# Patient Record
Sex: FEMALE | Race: BLACK OR AFRICAN AMERICAN | NOT HISPANIC OR LATINO | Employment: PART TIME | URBAN - METROPOLITAN AREA
[De-identification: names, ages, dates, MRNs, and addresses within clinical notes are randomized per-mention and may not be internally consistent; named-entity substitution may affect disease eponyms.]

---

## 2017-08-08 ENCOUNTER — OFFICE VISIT (OUTPATIENT)
Dept: SLEEP MEDICINE | Facility: CLINIC | Age: 72
End: 2017-08-08
Payer: MEDICARE

## 2017-08-08 VITALS
WEIGHT: 268.06 LBS | BODY MASS INDEX: 43.93 KG/M2 | DIASTOLIC BLOOD PRESSURE: 79 MMHG | SYSTOLIC BLOOD PRESSURE: 140 MMHG | HEART RATE: 79 BPM

## 2017-08-08 DIAGNOSIS — G47.30 SLEEP APNEA, UNSPECIFIED: Primary | ICD-10-CM

## 2017-08-08 DIAGNOSIS — J30.89 CHRONIC NON-SEASONAL ALLERGIC RHINITIS, UNSPECIFIED TRIGGER: ICD-10-CM

## 2017-08-08 DIAGNOSIS — F45.8 BRUXISM: ICD-10-CM

## 2017-08-08 DIAGNOSIS — E66.01 MORBID OBESITY WITH BMI OF 40.0-44.9, ADULT: ICD-10-CM

## 2017-08-08 PROCEDURE — 99999 PR PBB SHADOW E&M-EST. PATIENT-LVL IV: CPT | Mod: PBBFAC,,, | Performed by: NURSE PRACTITIONER

## 2017-08-08 PROCEDURE — 99204 OFFICE O/P NEW MOD 45 MIN: CPT | Mod: S$PBB,,, | Performed by: NURSE PRACTITIONER

## 2017-08-08 PROCEDURE — 1125F AMNT PAIN NOTED PAIN PRSNT: CPT | Mod: ,,, | Performed by: NURSE PRACTITIONER

## 2017-08-08 PROCEDURE — 1159F MED LIST DOCD IN RCRD: CPT | Mod: ,,, | Performed by: NURSE PRACTITIONER

## 2017-08-08 PROCEDURE — 3077F SYST BP >= 140 MM HG: CPT | Mod: ,,, | Performed by: NURSE PRACTITIONER

## 2017-08-08 PROCEDURE — 3078F DIAST BP <80 MM HG: CPT | Mod: ,,, | Performed by: NURSE PRACTITIONER

## 2017-08-08 PROCEDURE — 99214 OFFICE O/P EST MOD 30 MIN: CPT | Mod: PBBFAC | Performed by: NURSE PRACTITIONER

## 2017-08-08 RX ORDER — CHOLECALCIFEROL (VITAMIN D3) 25 MCG
5000 TABLET ORAL DAILY
COMMUNITY

## 2017-08-08 RX ORDER — NAPROXEN SODIUM 220 MG
220 TABLET ORAL 2 TIMES DAILY WITH MEALS
COMMUNITY
End: 2022-11-07

## 2017-08-08 RX ORDER — BUDESONIDE AND FORMOTEROL FUMARATE DIHYDRATE 160; 4.5 UG/1; UG/1
2 AEROSOL RESPIRATORY (INHALATION) EVERY 12 HOURS
COMMUNITY

## 2017-08-08 RX ORDER — IBUPROFEN 100 MG/5ML
1000 SUSPENSION, ORAL (FINAL DOSE FORM) ORAL DAILY
COMMUNITY

## 2017-08-08 RX ORDER — FLUTICASONE PROPIONATE 50 MCG
SPRAY, SUSPENSION (ML) NASAL
Refills: 3 | COMMUNITY
Start: 2017-06-13

## 2017-08-08 RX ORDER — MINERAL OIL
180 ENEMA (ML) RECTAL DAILY
COMMUNITY
End: 2022-11-07

## 2017-08-08 RX ORDER — ASPIRIN 81 MG/1
81 TABLET ORAL DAILY
COMMUNITY

## 2017-08-08 NOTE — PROGRESS NOTES
"Conchis Ford  was seen as a new patient at the request of  Isaac Butt MD for the evaluation of obstructive sleep apnea.    CHIEF COMPLAINT:    Chief Complaint   Patient presents with    Sleep Apnea       HISTORY OF PRESENT ILLNESS: Conchis Ford is a 71 y.o. female is here for sleep evaluation.       Patient complaints include: snoring, interrupted sleep, excessive daytime sleepiness, excessive daytime fatigue, witnessed apnea (since 1996) and unrefreshing sleep.   "I wake up drained like I didn't sleep"    Wears upper and lower partials. Has broken dental bridges in the past due to chronic teeth grinding. Removes partials before bed. Stopped wearing mouth guard 2 years ago.     Denies symptoms of restless legs or kicking during sleep.    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    SLEEP ROUTINE:    Bed partner:    Time to bed:  10 pm before work day, 10:30 pm before off day  Sleep onset latency:  10 minutes  Disruptions or awakenings:  3 - 4 x    Wakeup time:    6 a - 7 am  Perceived sleep quality:  2/5           PAST MEDICAL HISTORY:    Active Ambulatory Problems     Diagnosis Date Noted    Obesity 08/09/2016    Hypertension 08/09/2016    Chest pain, non-cardiac 08/09/2016     Resolved Ambulatory Problems     Diagnosis Date Noted    No Resolved Ambulatory Problems     Past Medical History:   Diagnosis Date    Anemia     Arthritis     Asthma     Dizziness     Hay fever     Hyperlipidemia     Hypertension     Obesity                 PAST SURGICAL HISTORY:    Past Surgical History:   Procedure Laterality Date    HYSTERECTOMY      KNEE SURGERY      septiplasty  09/91         FAMILY HISTORY:                No family history on file.    SOCIAL HISTORY:          Tobacco:   History   Smoking Status    Former Smoker    Packs/day: 1.00    Years: 30.00    Types: Cigarettes   Smokeless " Tobacco    Not on file       Alcohol use:    History   Alcohol Use No                 ALLERGIES:    Review of patient's allergies indicates:   Allergen Reactions    Ace inhibitors Itching    Ibuprofen Swelling     Lip swelling    Penicillins Hives       CURRENT MEDICATIONS:    Current Outpatient Prescriptions   Medication Sig Dispense Refill    amlodipine (NORVASC) 10 MG tablet 10 mg once daily.   2    ascorbic acid, vitamin C, (VITAMIN C) 1000 MG tablet Take 1,000 mg by mouth once daily.      aspirin (ECOTRIN) 81 MG EC tablet Take 81 mg by mouth once daily.      b complex vitamins capsule Take 1 capsule by mouth once daily.      brimonidine 0.2% (ALPHAGAN) 0.2 % Drop 3 (three) times daily.   4    budesonide-formoterol 160-4.5 mcg (SYMBICORT) 160-4.5 mcg/actuation HFAA Inhale 2 puffs into the lungs every 12 (twelve) hours. Controller      BYSTOLIC 10 mg Tab 10 mg once daily.   1    cyanocobalamin (VITAMIN B-12) 1000 MCG tablet Take 100 mcg by mouth once daily.      fexofenadine (ALLEGRA) 180 MG tablet Take 180 mg by mouth once daily.      fluticasone (FLONASE) 50 mcg/actuation nasal spray SPRAY ONCE  IN EACH NOSTRIL   BID  3    hydrochlorothiazide (HYDRODIURIL) 25 MG tablet Take 25 mg by mouth once daily.   2    lovastatin (MEVACOR) 20 MG tablet TK 1 T PO QD  2    metformin (GLUCOPHAGE) 1000 MG tablet 1,000 mg 2 (two) times daily with meals.   2    multivitamin capsule Take 1 capsule by mouth once daily.      naproxen sodium (ANAPROX) 220 MG tablet Take 220 mg by mouth 2 (two) times daily with meals.      potassium chloride SA (K-DUR,KLOR-CON) 20 MEQ tablet Take 20 mEq by mouth once daily.   1    PROAIR HFA 90 mcg/actuation inhaler INL 2 PFS PO Q 6 H PRF SOB OR WHZ  2    ranitidine (ZANTAC) 150 MG tablet Take 150 mg by mouth 2 (two) times daily.      vitamin D 1000 units Tab Take 5,000 Units by mouth once daily.       No current facility-administered medications for this visit.                    REVIEW OF SYSTEMS:     Sleep related symptoms as per HPI.  CONST:Denies weight gain    HEENT: Denies sinus congestion; sometimes dry mouth in AM  PULM: Sometimesdyspnea  CARD:  Reports palpitations   GI:  Reports acid reflux  : Denies polyuria  NEURO: Denies headaches; reports dizziness  PSYCH: Denies mood disturbance  HEME: Denies anemia    Otherwise, a balance of systems reviewed is negative.          PHYSICAL EXAM:  Vitals:    08/08/17 1341   BP: (!) 140/79   Pulse: 79   Weight: 121.6 kg (268 lb 1.3 oz)   PainSc:   4   PainLoc: Knee     Body mass index is 43.93 kg/m².     GENERAL: Obese body habitus, well groomed  HEENT:  Conjunctivae are non-erythematous; Pupils equal, round, and reactive to light; Nose is symmetrical; Nasal mucosa is pink and moist; Septum is midline; Inferior turbinates are enlarged; Nasal airflow left > right; Posterior pharynx is pink; Modified Mallampati: III; Posterior palate is normal; Tonsils +1; Uvula is normal and pink;Tongue is poor; Dentition is poor; No TMJ tenderness; Jaw opening and protrusion without click and without discomfort.  NECK: Supple. Neck circumference is 15 inches. No thyromegaly. No palpable nodes.    SKIN: On face and neck: No abrasions, no rashes, no lesions.  No subcutaneous nodules are palpable.  RESPIRATORY: Chest is clear to auscultation.  Normal chest expansion and non-labored breathing at rest.  CARDIOVASCULAR: Normal S1, S2.  No murmurs, gallops or rubs. No carotid bruits bilaterally.  EXTREMITIES: No edema. No clubbing. No cyanosis. Station normal. Gait normal.        NEURO/PSYCH: Oriented to time, place and person. Normal attention span and concentration. Affect is full. Mood is normal.                                              ASSESSMENT:    Sleep apnea, unspecified. The patient symptomatically has snoring, interrupted sleep, excessive daytime sleepiness, excessive daytime fatigue, witnessed apnea and unrefreshing sleep with findings of crowded  oral airway and elevated body mas index. Medical co-morbidities: essential HTN, asthma, DM2, HLD, and morbid obesity.  This warrants further investigation for possible obstructive sleep apnea.      Bruxism, chronic     Allergic rhinitis, chronic, mostly controlled with daily Flonase and Zyrtec; congestion could make acclimating to PAP therapy more difficult     Morbid obesity, BMI >40, discussed relationship between weight and RAMESH    PLAN:    - Diagnostic: Polysomnogram. The nature of this procedure and its indication was discussed with the patient. Patient will be contacted after sleep study is done.  RTC to review sleep study results.     - Education: During our discussion today, we talked about the etiology of obstructive sleep apnea as well as the potential ramifications of untreated sleep apnea, which could include daytime sleepiness, hypertension, heart disease and/or stroke. We discussed potential treatment options, which could include weight loss, body positioning, continuous positive airway pressure (CPAP), or referral for surgical consideration.     - Precautions: The patient was advised to abstain from driving should they feel sleepy  or drowsy.     -Counseling regarding benefits of healthy eating and physical activity (150 minutes of moderate-intensity aerobic activity per week) for weight reduction which can improve overall health.     Thank you for allowing me the opportunity to participate in the care of your patient.

## 2017-08-08 NOTE — LETTER
August 8, 2017      Isaac Butt MD  [address] [facility] [address]           [facility]  [address]  [facility] [address]  Phone: [phone]          Patient: Conchis Fodr   MR Number: [id number]   YOB: 1945   Date of Visit: 8/8/2017       Dear Dr. Isaac Butt:    Thank you for referring Conchis Ford to me for evaluation. Attached you will find relevant portions of my assessment and plan of care.    If you have questions, please do not hesitate to call me. I look forward to following Conchis Ford along with you.    Sincerely,    Alma Delia Pickens, ANASTASIA    Enclosure  CC:  No Recipients    If you would like to receive this communication electronically, please contact [email or web address] or [phone] to request more information on codebender Link access.    For providers and/or their staff who would like to refer a patient to [facility], please contact us through our one-stop-shop provider referral line, [facility] , at [phone].    If you feel you have received this communication in error or would no longer like to receive these types of communications, please e-mail [email or web address]

## 2017-08-08 NOTE — PATIENT INSTRUCTIONS
[facility] will contact you to schedule your sleep study. Their number is [phone] (ext 2). The [facility] is located on 7th floor of the [facility].    We will call you when the sleep study results are ready - if you have not heard from us by 2 weeks from the date of the study, please call [phone] (ext 1).    You are advised to abstain from driving should you feel sleepy or drowsy.

## 2017-08-11 ENCOUNTER — TELEPHONE (OUTPATIENT)
Dept: SLEEP MEDICINE | Facility: OTHER | Age: 72
End: 2017-08-11

## 2017-08-19 ENCOUNTER — HOSPITAL ENCOUNTER (OUTPATIENT)
Dept: SLEEP MEDICINE | Facility: OTHER | Age: 72
Discharge: HOME | End: 2017-08-19
Attending: NURSE PRACTITIONER
Payer: MEDICARE

## 2017-08-19 DIAGNOSIS — G47.33 OSA (OBSTRUCTIVE SLEEP APNEA): ICD-10-CM

## 2017-08-19 DIAGNOSIS — G47.30 SLEEP APNEA, UNSPECIFIED: ICD-10-CM

## 2017-08-19 PROCEDURE — 95811 POLYSOM 6/>YRS CPAP 4/> PARM: CPT

## 2017-08-19 PROCEDURE — 95811 POLYSOM 6/>YRS CPAP 4/> PARM: CPT | Mod: 26,,, | Performed by: PSYCHIATRY & NEUROLOGY

## 2017-08-20 NOTE — PROGRESS NOTES
End of The night summary    Type of Study Performed on (DREW JENSEN) Split    Patient education/cpap information prior to Study/Setup     EKG: Appears to be- NSR w pacs, increased ekg     Low Spo2 77%    Any Difficulties recording:NONE    Optimal pressure# 14    MASK: [employer]    Pt reaction to CPAP: pt reports cpap pressure felt alittle too much when pt woke up    Tech summary comments:    pt met criteria for split on cpap, soft to moderate snoring observed , most of patients events observed in REM sleep with significant drop in spo2 observed, in the middle of the pt reported that cpap pressure felt high when pt woke up, pt requested to lower pressure, pt may need to get adjusted to cpap w a ramp or possible bipap pressures more comfortable? pt slept well most of the night no reports of discomfort

## 2017-08-31 ENCOUNTER — TELEPHONE (OUTPATIENT)
Dept: SLEEP MEDICINE | Facility: CLINIC | Age: 72
End: 2017-08-31

## 2017-08-31 NOTE — TELEPHONE ENCOUNTER
Please notify pt that 08/19/2017 in-lab sleep study results are available. Schedule for f/u to review.

## 2017-09-05 ENCOUNTER — OFFICE VISIT (OUTPATIENT)
Dept: SLEEP MEDICINE | Facility: CLINIC | Age: 72
End: 2017-09-05
Payer: MEDICARE

## 2017-09-05 VITALS
DIASTOLIC BLOOD PRESSURE: 82 MMHG | WEIGHT: 265 LBS | HEART RATE: 78 BPM | HEIGHT: 66 IN | SYSTOLIC BLOOD PRESSURE: 132 MMHG | BODY MASS INDEX: 42.59 KG/M2

## 2017-09-05 DIAGNOSIS — E66.01 SEVERE OBESITY (BMI >= 40): ICD-10-CM

## 2017-09-05 DIAGNOSIS — G47.33 OSA (OBSTRUCTIVE SLEEP APNEA): Primary | ICD-10-CM

## 2017-09-05 DIAGNOSIS — F45.8 BRUXISM: ICD-10-CM

## 2017-09-05 PROCEDURE — 3079F DIAST BP 80-89 MM HG: CPT | Mod: ,,, | Performed by: NURSE PRACTITIONER

## 2017-09-05 PROCEDURE — 99214 OFFICE O/P EST MOD 30 MIN: CPT | Mod: S$PBB,,, | Performed by: NURSE PRACTITIONER

## 2017-09-05 PROCEDURE — 3075F SYST BP GE 130 - 139MM HG: CPT | Mod: ,,, | Performed by: NURSE PRACTITIONER

## 2017-09-05 PROCEDURE — 1159F MED LIST DOCD IN RCRD: CPT | Mod: ,,, | Performed by: NURSE PRACTITIONER

## 2017-09-05 PROCEDURE — 1126F AMNT PAIN NOTED NONE PRSNT: CPT | Mod: ,,, | Performed by: NURSE PRACTITIONER

## 2017-09-05 PROCEDURE — 99999 PR PBB SHADOW E&M-EST. PATIENT-LVL IV: CPT | Mod: PBBFAC,,, | Performed by: NURSE PRACTITIONER

## 2017-09-05 PROCEDURE — 99214 OFFICE O/P EST MOD 30 MIN: CPT | Mod: PBBFAC | Performed by: NURSE PRACTITIONER

## 2017-09-05 NOTE — PROGRESS NOTES
"Conchis Ford  was seen in follow-up to discuss sleep study results and potential treatment options.     09/05/2017 Checked-in at 7:46 am for 7:40 am appt.     CHIEF COMPLAINT:    Chief Complaint   Patient presents with    Sleep Apnea       08/08/2017 RILEY Pickens NP: HISTORY OF PRESENT ILLNESS: Conchis Ford is a 71 y.o. female is here for sleep evaluation.       Patient complaints include: snoring, interrupted sleep, excessive daytime sleepiness, excessive daytime fatigue, witnessed apnea (since 1996) and unrefreshing sleep.   "I wake up drained like I didn't sleep"     Wears upper and lower partials. Has broken dental bridges in the past due to chronic teeth grinding. Removes partials before bed. Stopped wearing mouth guard 2 years ago.     Denies symptoms of restless legs or kicking during sleep.    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    SLEEP ROUTINE:    Bed partner:    Time to bed:  10 pm before work day, 10:30 pm before off day  Sleep onset latency:  10 minutes  Disruptions or awakenings:  3 - 4 x    Wakeup time:    6 a - 7 am  Perceived sleep quality:  2/5         INTERVAL HISTORY:    09/05/2017 RILEY Pickens NP: Discussed 08/19/2017 in-lab sleep study results. Pt complaints of snoring, interrupted sleep, excessive daytime sleepiness, excessive daytime fatigue, witnessed apnea, and unrefreshing sleep remain the same. ESS 13.      Baseline Sleep Study: 08/19/2017 Split night study 268 lb. The overall AHI was 11.8 with an oxygen izabella of 77.0%. Effective control of sleep disordered breathing was seen during supine REM stage sleep at a pressure of 14 cm of water.    PAST MEDICAL HISTORY:    Active Ambulatory Problems     Diagnosis Date Noted    Obesity 08/09/2016    Hypertension 08/09/2016    Chest pain, non-cardiac 08/09/2016    Sleep apnea, unspecified     RAMESH (obstructive sleep apnea)  "     Resolved Ambulatory Problems     Diagnosis Date Noted    No Resolved Ambulatory Problems     Past Medical History:   Diagnosis Date    Anemia     Arthritis     Asthma     Dizziness     Hay fever     Hyperlipidemia     Hypertension     Obesity                 PAST SURGICAL HISTORY:    Past Surgical History:   Procedure Laterality Date    HYSTERECTOMY      KNEE SURGERY      septiplasty  09/91         FAMILY HISTORY:                No family history on file.    SOCIAL HISTORY:          Tobacco:   History   Smoking Status    Former Smoker    Packs/day: 1.00    Years: 30.00    Types: Cigarettes   Smokeless Tobacco    Never Used       Alcohol use:    History   Alcohol Use No                 ALLERGIES:    Review of patient's allergies indicates:   Allergen Reactions    Ace inhibitors Itching    Ibuprofen Swelling     Lip swelling    Penicillins Hives       CURRENT MEDICATIONS:    Current Outpatient Prescriptions   Medication Sig Dispense Refill    amlodipine (NORVASC) 10 MG tablet 10 mg once daily.   2    ascorbic acid, vitamin C, (VITAMIN C) 1000 MG tablet Take 1,000 mg by mouth once daily.      aspirin (ECOTRIN) 81 MG EC tablet Take 81 mg by mouth once daily.      b complex vitamins capsule Take 1 capsule by mouth once daily.      brimonidine 0.2% (ALPHAGAN) 0.2 % Drop 3 (three) times daily.   4    budesonide-formoterol 160-4.5 mcg (SYMBICORT) 160-4.5 mcg/actuation HFAA Inhale 2 puffs into the lungs every 12 (twelve) hours. Controller      BYSTOLIC 10 mg Tab 10 mg once daily.   1    cyanocobalamin (VITAMIN B-12) 1000 MCG tablet Take 100 mcg by mouth once daily.      fexofenadine (ALLEGRA) 180 MG tablet Take 180 mg by mouth once daily.      fluticasone (FLONASE) 50 mcg/actuation nasal spray SPRAY ONCE  IN EACH NOSTRIL   BID  3    hydrochlorothiazide (HYDRODIURIL) 25 MG tablet Take 25 mg by mouth once daily.   2    lovastatin (MEVACOR) 20 MG tablet TK 1 T PO QD  2    metformin  "(GLUCOPHAGE) 1000 MG tablet 1,000 mg 2 (two) times daily with meals.   2    multivitamin capsule Take 1 capsule by mouth once daily.      naproxen sodium (ANAPROX) 220 MG tablet Take 220 mg by mouth 2 (two) times daily with meals.      potassium chloride SA (K-DUR,KLOR-CON) 20 MEQ tablet Take 20 mEq by mouth once daily.   1    PROAIR HFA 90 mcg/actuation inhaler INL 2 PFS PO Q 6 H PRF SOB OR WHZ  2    ranitidine (ZANTAC) 150 MG tablet Take 150 mg by mouth 2 (two) times daily.      vitamin D 1000 units Tab Take 5,000 Units by mouth once daily.       No current facility-administered medications for this visit.                   REVIEW OF SYSTEMS:     Sleep related symptoms as per HPI.  CONST:Denies weight gain    HEENT: Denies sinus congestion; sometimes dry mouth in AM  PULM: Sometimesdyspnea  CARD:  Reports palpitations   GI:  Reports acid reflux  : Denies polyuria  NEURO: Denies headaches; reports dizziness  PSYCH: Denies mood disturbance  HEME: Denies anemia    Otherwise, a balance of systems reviewed is negative.          PHYSICAL EXAM:  Vitals:    09/05/17 0753   BP: 132/82   Pulse: 78   Weight: 120.2 kg (264 lb 15.9 oz)   Height: 5' 5.5" (1.664 m)   PainSc: 0-No pain     Body mass index is 43.43 kg/m².     GENERAL: Obese body habitus, well groomed  HEENT:  Conjunctivae are non-erythematous; Pupils equal, round, and reactive to light; Nose is symmetrical; Nasal mucosa is pink and moist; Septum is midline; Inferior turbinates are enlarged; Nasal airflow left > right; Posterior pharynx is pink; Modified Mallampati: III; Posterior palate is normal; Tonsils +1; Uvula is normal and pink;Tongue is poor; Dentition is poor; No TMJ tenderness; Jaw opening and protrusion without click and without discomfort.  NECK: Supple. Neck circumference is 15 inches. No thyromegaly. No palpable nodes.    SKIN: On face and neck: No abrasions, no rashes, no lesions.  No subcutaneous nodules are palpable.  RESPIRATORY: Chest is " clear to auscultation.  Normal chest expansion and non-labored breathing at rest.  CARDIOVASCULAR: Normal S1, S2.  No murmurs, gallops or rubs. No carotid bruits bilaterally.  EXTREMITIES: No edema. No clubbing. No cyanosis. Station normal. Gait normal.        NEURO/PSYCH: Oriented to time, place and person. Normal attention span and concentration. Affect is full. Mood is normal.                                              ASSESSMENT:    Obstructive sleep apnea, mild by AHI. The patient symptomatically has snoring, interrupted sleep, excessive daytime sleepiness, excessive daytime fatigue, witnessed apnea and unrefreshing sleep with findings of crowded oral airway and elevated body mas index. Medical co-morbidities: essential HTN, asthma, DM2, HLD, and morbid obesity.  This warrants treatment for obstructive sleep apnea.      Bruxism, chronic, monitored     Allergic rhinitis, chronic, mostly controlled with daily Flonase and Zyrtec; congestion could make acclimating to PAP therapy more difficult     Morbid obesity, BMI >40, discussed relationship between weight and RAMESH    PLAN:    - Education: During our discussion today, we talked about the etiology of obstructive sleep apnea as well as the potential ramifications of untreated sleep apnea, which could include daytime sleepiness, hypertension, heart disease and/or stroke. We discussed potential treatment options, which could include weight loss, body positioning, continuous positive airway pressure (CPAP), OA, EPAP,  or referral for surgical consideration.     -Treatment: CPAP 14 cm. RTC 31 - 90 days after CPAP set up. Pt interested in getting SoClean Sanitizing machine, Rx provided in case online vendor requires.     - Precautions: The patient was advised to abstain from driving should they feel sleepy  or drowsy.     -Counseling regarding benefits of healthy eating and physical activity (150 minutes of moderate-intensity aerobic activity per week) for weight  reduction which can improve overall health.

## 2017-09-06 ENCOUNTER — TELEPHONE (OUTPATIENT)
Dept: SLEEP MEDICINE | Facility: CLINIC | Age: 72
End: 2017-09-06

## 2017-09-06 NOTE — TELEPHONE ENCOUNTER
Received pt order confirmation letter for CPAP SYSTEM from [facility].    Sent to scan

## 2017-09-20 ENCOUNTER — TELEPHONE (OUTPATIENT)
Dept: SLEEP MEDICINE | Facility: CLINIC | Age: 72
End: 2017-09-20

## 2017-09-20 NOTE — TELEPHONE ENCOUNTER
Faxed pt sleep study results, most recent chart notes and most recent machine/supplies order to [facility] just incase they said I sent anything again.    Pt has been informed

## 2017-09-20 NOTE — TELEPHONE ENCOUNTER
----- Message from Shaye Pineda sent at 9/20/2017 12:44 PM CDT -----  Contact: pt  x_  1st Request  _  2nd Request  _  3rd Request      Who:pt    Why: states that [facility] never received sleep study results     What Number to Call Back: [phone]    When to Expect a call back: (Before the end of the day)   -- if call after 3:00 call back will be tomorrow.

## 2017-10-03 ENCOUNTER — TELEPHONE (OUTPATIENT)
Dept: SLEEP MEDICINE | Facility: CLINIC | Age: 72
End: 2017-10-03

## 2017-10-03 NOTE — TELEPHONE ENCOUNTER
Fax: pt 2 previous F2F encounters, split night study, and 9/05 CPAP/Supplies Rx to [phone] and [phone]

## 2017-10-03 NOTE — TELEPHONE ENCOUNTER
"I Only called [facility] to check on pt order status, Per pt request,  and it turned out that DME did not even have a clear Rx and noone from [facility] called clinic or pt to inform of any rx issues,  Which got me stock on the phone much longer than I anticipated     Spoke supervisor at [facility] and she said that there appear to be 3 Rx in their system on for [facility] CPAP machine, one for CPAP/Supplies and another that is not in pt chart (?)  [facility] supervisor said that supposedly the previous orders are invalid.  She is requesting that we send a "valid Rx"    Also because pt AHI is 11.8 which is under 15 pt needs a secondary diagnose such as CHF, HTN or insomnia to meet pt insurance requirement    [facility] requesting:  Pt eval. chart note, sleep studies, and valid Rx.  I had fax all those document over to [facility] on 9/20/17 yet I'm told by supervisor that they do not have those doc.      "

## 2017-10-03 NOTE — TELEPHONE ENCOUNTER
----- Message from Danilo Castellanos sent at 10/3/2017 10:03 AM CDT -----  Contact: Conchis Ford  _x  1st Request  _  2nd Request  _  3rd Request        Who: Conchis Ford    Why: Patient following on getting her CPAP machine and why she has to use [facility]    Please return the call at earliest convenience. Thanks!    What Number to Call Back: [phone]    When to Expect a call back: (Within 24 hours)

## 2017-10-03 NOTE — TELEPHONE ENCOUNTER
"Since patient has Medicare for insurance, she is limited to going to a DME that accepts Medicare which are: [facility], [facility], and [facility]. Of the three, I find [facility] easiest to deal with.     An AHI of 11.8 with associated symptoms of snoring, interrupted sleep, excessive daytime sleepiness, excessive daytime fatigue, witnessed apnea, and unrefreshing sleep warrants CPAP treatment under Medicare.     Patient's last face-to-face visit was 09/05/2017 and there were two orders placed because one order is for actual CPAP machine with supplies called "CPAP for Home Use" and the other order on same date "CPAP/BIPAP Supplies" was provided for patient in case she needs in should she get SoClean CPAP  from an online vendor.     The order that [facility] should get is the 09/05/2017 "CPAP FOR HOME USE" order.   "

## 2017-10-04 ENCOUNTER — TELEPHONE (OUTPATIENT)
Dept: SLEEP MEDICINE | Facility: CLINIC | Age: 72
End: 2017-10-04

## 2017-10-04 DIAGNOSIS — G47.33 OBSTRUCTIVE SLEEP APNEA: Primary | ICD-10-CM

## 2017-10-04 NOTE — TELEPHONE ENCOUNTER
Spoke with Ms Ford and informed her what Omar from [facility] had told me concerning pt CPAP order:  [facility] receive pt required document and within 24 to 48 hours pt will be contacted.    Pt told me that she even went to [facility] location to talk to someone and they had told her that she needs to provide sleep study and that she doesn't meet insurance guidelines to receive a machine.

## 2017-10-04 NOTE — TELEPHONE ENCOUNTER
----- Message from Noé Jones sent at 10/4/2017  1:55 PM CDT -----  Contact: Pt  X_ 1st Request  _ 2nd Request  _ 3rd Request    Who:CHERELLE HILLMAN [[id number]]    Why: Patient is requesting a call back     What Number to Call Back: [phone]    When to Expect a call back: (Before the end of the day)  -- if call after 3:00 call back will be tomorrow.

## 2017-10-05 ENCOUNTER — TELEPHONE (OUTPATIENT)
Dept: SLEEP MEDICINE | Facility: CLINIC | Age: 72
End: 2017-10-05

## 2017-10-05 NOTE — TELEPHONE ENCOUNTER
DMEs who accept Medicare are: [facility], [facility], and [facility].     Please forward required documents for CPAP  to [facility]. I have placed new CPAP order specifically for [facility] under Supplies.  If any issues, talk with Dionisio Frank his cell [phone] and office [phone]; DME fax is [phone].    Please send patient my apologies, this is my first issue like this with [facility].

## 2017-10-05 NOTE — TELEPHONE ENCOUNTER
----- Message from Luz Elena Miranda MA sent at 10/4/2017  2:44 PM CDT -----  Contact [facility] to check on pt CPAP order.  Order re-sent [facility] confirm that they received it and pt should be contacted within 24-48 hrs(2 days from the day of order)    [facility].    Tuesday October 3, 2017 spoke with supervisor Sapna, who just lied about not having on the pt order NP information and also not even having pt order at all but just a [facility]an order.    Wednesday October 4, 2017 spoke with José Miguel.  He confirmed that he received pt order and pt should be contacted within 24-48 hours

## 2017-10-05 NOTE — TELEPHONE ENCOUNTER
Fax all necessary document to [facility] fax is [phone]  Slit night record, Evaluation encounter 8/08, progress note 9/05, and CPAP for home use order.    Spoke with Fco from [facility] and he confirm that pt order is received and in processing...      Left a detailed message informing pt that CPAP order has been sent to [facility] and provided contact info

## 2017-10-06 ENCOUNTER — TELEPHONE (OUTPATIENT)
Dept: SLEEP MEDICINE | Facility: CLINIC | Age: 72
End: 2017-10-06

## 2017-10-06 ENCOUNTER — PATIENT MESSAGE (OUTPATIENT)
Dept: SLEEP MEDICINE | Facility: CLINIC | Age: 72
End: 2017-10-06

## 2017-10-06 NOTE — TELEPHONE ENCOUNTER
Luz Elena, I replied to pt message. Please contact her regarding rescheduling f/u appt after CPAP . Thanks, sv

## 2017-10-06 NOTE — TELEPHONE ENCOUNTER
Received DME RX confirmation request from [facility]  Gave to Radha to sign  Radha signed it   Fax it back to DME  Sent it to be scan

## 2017-10-10 ENCOUNTER — TELEPHONE (OUTPATIENT)
Dept: SLEEP MEDICINE | Facility: CLINIC | Age: 72
End: 2017-10-10

## 2017-10-10 NOTE — TELEPHONE ENCOUNTER
Luz Elena, when you get a moment today could you please call [facility] to let them know that I am still not able to find patients on [facility] set up by them. This was something that Dionisio Frank has worked on for me. For instance, since Ms. Ford has been set up with her CPAP I should be able to now see her use on [employer].

## 2017-11-07 ENCOUNTER — OFFICE VISIT (OUTPATIENT)
Dept: SLEEP MEDICINE | Facility: CLINIC | Age: 72
End: 2017-11-07
Payer: MEDICARE

## 2017-11-07 VITALS
OXYGEN SATURATION: 97 % | DIASTOLIC BLOOD PRESSURE: 84 MMHG | HEIGHT: 66 IN | WEIGHT: 272.25 LBS | HEART RATE: 84 BPM | BODY MASS INDEX: 43.75 KG/M2 | SYSTOLIC BLOOD PRESSURE: 132 MMHG

## 2017-11-07 DIAGNOSIS — G47.33 OBSTRUCTIVE SLEEP APNEA: Primary | ICD-10-CM

## 2017-11-07 DIAGNOSIS — E66.01 SEVERE OBESITY (BMI >= 40): ICD-10-CM

## 2017-11-07 PROCEDURE — 99999 PR PBB SHADOW E&M-EST. PATIENT-LVL IV: CPT | Mod: PBBFAC,,, | Performed by: NURSE PRACTITIONER

## 2017-11-07 PROCEDURE — 99214 OFFICE O/P EST MOD 30 MIN: CPT | Mod: PBBFAC | Performed by: NURSE PRACTITIONER

## 2017-11-07 PROCEDURE — 99214 OFFICE O/P EST MOD 30 MIN: CPT | Mod: S$PBB,,, | Performed by: NURSE PRACTITIONER

## 2017-11-07 RX ORDER — MONTELUKAST SODIUM 10 MG/1
TABLET ORAL
COMMUNITY
Start: 2017-11-05

## 2017-11-07 NOTE — PROGRESS NOTES
"Conchis Ford  was seen in follow-up for RAMESH management and CPAP equipment check after set up.     09/05/2017 Checked-in at 7:46 am for 7:40 am appt.     CHIEF COMPLAINT:    Chief Complaint   Patient presents with    Sleep Apnea       08/08/2017 RILEY Pickens NP: HISTORY OF PRESENT ILLNESS: Conchis Ford is a 72 y.o. female is here for sleep evaluation.       Patient complaints include: snoring, interrupted sleep, excessive daytime sleepiness, excessive daytime fatigue, witnessed apnea (since 1996) and unrefreshing sleep.   "I wake up drained like I didn't sleep"     Wears upper and lower partials. Has broken dental bridges in the past due to chronic teeth grinding. Removes partials before bed. Stopped wearing mouth guard 2 years ago.     Denies symptoms of restless legs or kicking during sleep.    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    SLEEP ROUTINE:    Bed partner:    Time to bed:  10 pm before work day, 10:30 pm before off day  Sleep onset latency:  10 minutes  Disruptions or awakenings:  3 - 4 x    Wakeup time:    6 a - 7 am  Perceived sleep quality:  2/5         INTERVAL HISTORY:    09/05/2017 RILEY Pickens NP: Discussed 08/19/2017 in-lab sleep study results. Pt complaints of snoring, interrupted sleep, excessive daytime sleepiness, excessive daytime fatigue, witnessed apnea, and unrefreshing sleep remain the same. ESS 13.      11/07/2017 RILEY Hastings: Pt returns after set up of CPAP machine at [facility] on 10/06/2017.  CPAP machine order originally sent to [facility] 9/5, but [facility] had delay set up so Rx sent to [facility] instead. She has also gotten SoClean CPAP  machine, which she likes a lot. Pt reports that snoring, interrupted sleep, excessive daytime sleepiness, excessive daytime fatigue, witnessed apnea, and unrefreshing sleep now resolved with CPAP use. Denies pressure " intolerance. Denies nasal drying. Reports rare mild oral drying despite using chinstrap with nasal gel pillow mask.  ESS form not completed. Pt right arm in sling because of radius fracture, scheduled for surgery 11/13.     CPAP Interrogation: did not bring CPAP machine  DreamStation CPAP 14 cm  Compliance Summary Days with Device Usage: 30 days Percentage of Days >=4 Hours: 100.0% Average Usage (Days Used): 7 hrs. 45 mins. 12 secs. Average Usage (All Days): 7 hrs. 45 mins. 12 secs.  Apnea Indices Average AHI: 2.4 Average OA Index: 0.8 Average CA Index: 0.2   Large Leak Average Time in Large Leak: 22 mins. 42 secs. Average % of Night in Large Leak: 4.9%   Periodic Breathing Average % of Night in PB: 0.2%       Baseline Sleep Study: 08/19/2017 Split night study 268 lb. The overall AHI was 11.8 with an oxygen izabella of 77.0%. Effective control of sleep disordered breathing was seen during supine REM stage sleep at a pressure of 14 cm of water.    PAST MEDICAL HISTORY:    Active Ambulatory Problems     Diagnosis Date Noted    Severe obesity (BMI >= 40) 08/09/2016    Hypertension 08/09/2016    Chest pain, non-cardiac 08/09/2016    RAMESH (obstructive sleep apnea)      Resolved Ambulatory Problems     Diagnosis Date Noted    Sleep apnea, unspecified      Past Medical History:   Diagnosis Date    Anemia     Arthritis     Asthma     Dizziness     Hay fever     Hyperlipidemia     Hypertension     Obesity                 PAST SURGICAL HISTORY:    Past Surgical History:   Procedure Laterality Date    HYSTERECTOMY      KNEE SURGERY      septiplasty  09/91         FAMILY HISTORY:                No family history on file.    SOCIAL HISTORY:          Tobacco:   History   Smoking Status    Former Smoker    Packs/day: 1.00    Years: 30.00    Types: Cigarettes   Smokeless Tobacco    Never Used       Alcohol use:    History   Alcohol Use No                 ALLERGIES:    Review of patient's allergies indicates:    Allergen Reactions    Ace inhibitors Itching    Ibuprofen Swelling     Lip swelling    Penicillins Hives       CURRENT MEDICATIONS:    Current Outpatient Prescriptions   Medication Sig Dispense Refill    amlodipine (NORVASC) 10 MG tablet 10 mg once daily.   2    ascorbic acid, vitamin C, (VITAMIN C) 1000 MG tablet Take 1,000 mg by mouth once daily.      aspirin (ECOTRIN) 81 MG EC tablet Take 81 mg by mouth once daily.      b complex vitamins capsule Take 1 capsule by mouth once daily.      brimonidine 0.2% (ALPHAGAN) 0.2 % Drop 3 (three) times daily.   4    budesonide-formoterol 160-4.5 mcg (SYMBICORT) 160-4.5 mcg/actuation HFAA Inhale 2 puffs into the lungs every 12 (twelve) hours. Controller      BYSTOLIC 10 mg Tab 10 mg once daily.   1    cyanocobalamin (VITAMIN B-12) 1000 MCG tablet Take 100 mcg by mouth once daily.      fexofenadine (ALLEGRA) 180 MG tablet Take 180 mg by mouth once daily.      fluticasone (FLONASE) 50 mcg/actuation nasal spray SPRAY ONCE  IN EACH NOSTRIL   BID  3    hydrochlorothiazide (HYDRODIURIL) 25 MG tablet Take 25 mg by mouth once daily.   2    lovastatin (MEVACOR) 20 MG tablet TK 1 T PO QD  2    metformin (GLUCOPHAGE) 1000 MG tablet 1,000 mg 2 (two) times daily with meals.   2    multivitamin capsule Take 1 capsule by mouth once daily.      naproxen sodium (ANAPROX) 220 MG tablet Take 220 mg by mouth 2 (two) times daily with meals.      potassium chloride SA (K-DUR,KLOR-CON) 20 MEQ tablet Take 20 mEq by mouth once daily.   1    PROAIR HFA 90 mcg/actuation inhaler INL 2 PFS PO Q 6 H PRF SOB OR WHZ  2    ranitidine (ZANTAC) 150 MG tablet Take 150 mg by mouth 2 (two) times daily.      vitamin D 1000 units Tab Take 5,000 Units by mouth once daily.       No current facility-administered medications for this visit.                   REVIEW OF SYSTEMS:     Sleep related symptoms as per HPI.  CONST:Denies weight gain    HEENT: Denies sinus congestion; sometimes dry mouth  "in AM  PULM: Sometimesdyspnea  CARD:  Reports palpitations   GI:  Reports acid reflux  : Denies polyuria  NEURO: Denies headaches; reports dizziness  PSYCH: Denies mood disturbance  HEME: Denies anemia    Otherwise, a balance of systems reviewed is negative.          PHYSICAL EXAM:  Vitals:    11/07/17 0735   BP: 132/84   Pulse: 84   SpO2: 97%   Weight: 123.5 kg (272 lb 4.3 oz)   Height: 5' 5.5" (1.664 m)   PainSc: 0-No pain     Body mass index is 44.62 kg/m².     GENERAL: Obese body habitus, well groomed  HEENT:  Conjunctivae are non-erythematous; Pupils equal, round, and reactive to light; Nose is symmetrical; Nasal mucosa is pink and moist; Septum is midline; Inferior turbinates are enlarged; Nasal airflow left > right; Posterior pharynx is pink; Modified Mallampati: III; Posterior palate is normal; Tonsils +1; Uvula is normal and pink;Tongue is poor; Dentition is poor; No TMJ tenderness; Jaw opening and protrusion without click and without discomfort.  NECK: Supple. Neck circumference is 15 inches. No thyromegaly. No palpable nodes.    SKIN: On face and neck: No abrasions, no rashes, no lesions.  No subcutaneous nodules are palpable.  RESPIRATORY: Chest is clear to auscultation.  Normal chest expansion and non-labored breathing at rest.  CARDIOVASCULAR: Normal S1, S2.  No murmurs, gallops or rubs. No carotid bruits bilaterally.  EXTREMITIES: No edema. No clubbing. No cyanosis. Station normal. Gait normal.        NEURO/PSYCH: Oriented to time, place and person. Normal attention span and concentration. Affect is full. Mood is normal.                                              ASSESSMENT:    Obstructive sleep apnea, mild by AHI. The patient symptomatically has snoring, interrupted sleep, excessive daytime sleepiness, excessive daytime fatigue, witnessed apnea and unrefreshing sleep now resolved with CPAP use. The patient is adherent on CPAP and experiencing symptomatic benefit. Medical co-morbidities: essential " HTN, asthma, DM2, HLD, and morbid obesity.  This warrants treatment for obstructive sleep apnea.      Bruxism, chronic, monitored     Allergic rhinitis, chronic, mostly controlled with daily Flonase and Zyrtec; congestion could make acclimating to PAP therapy more difficult     Morbid obesity, BMI >40, discussed relationship between weight and RAMESH    PLAN:    Treatment:  continue CPAP 14 cm. Updated Rx for supplies: FFM. If not FFM, continue nasal mask coupled with chinstrap for oral drying.  Increase humidity on machine prn. RTC 12 months, sooner if needed.      Education: During our discussion today, we talked about the etiology of obstructive sleep apnea as well as the potential ramifications of untreated sleep apnea, which could include daytime sleepiness, hypertension, heart disease and/or stroke. We discussed potential treatment options, which could include weight loss, body positioning, continuous positive airway pressure (CPAP), OA, EPAP,  or referral for surgical consideration.     Precautions: The patient was advised to abstain from driving should they feel sleepy  or drowsy.     Counseling regarding benefits of healthy eating and physical activity (150 minutes of moderate-intensity aerobic activity per week) for weight reduction which can improve overall health.

## 2017-11-17 ENCOUNTER — TELEPHONE (OUTPATIENT)
Dept: SLEEP MEDICINE | Facility: CLINIC | Age: 72
End: 2017-11-17

## 2017-11-17 NOTE — TELEPHONE ENCOUNTER
Received fax request from [facility] for pt chart notes  Faxed pt chart to DME per request

## 2018-04-04 ENCOUNTER — TELEPHONE (OUTPATIENT)
Dept: SLEEP MEDICINE | Facility: CLINIC | Age: 73
End: 2018-04-04

## 2018-04-04 NOTE — TELEPHONE ENCOUNTER
Rx for new supplies was received from [facility] ([facility])  Radha signed order needed then emailed and faxed to [facility]

## 2018-04-18 ENCOUNTER — TELEPHONE (OUTPATIENT)
Dept: SLEEP MEDICINE | Facility: CLINIC | Age: 73
End: 2018-04-18

## 2018-04-18 NOTE — TELEPHONE ENCOUNTER
----- Message from Barbara Rosenberg MA sent at 4/17/2018  5:15 PM CDT -----  Tati Garcia Staff  Caller: Self (Today,  3:22 PM)                   Name of Who is Calling: Self       What is the request in detail: Pt states she would like to speak to Radha Pickens NP today regarding a form that [facility] ([place], [place]) sent to the clinical team for a corrected signature that is still needed. Pt states her supplies will be outdated by Friday       Can the clinic reply by [facility]: N       What Number to Call Back if not in [facility]: [phone]

## 2018-04-18 NOTE — TELEPHONE ENCOUNTER
Spoke with Kenny at [facility] and he said that she will have whomever pt spoke with send the request form that requires provider signature to us ASAP.    Called pt and informed her and pt said [facility] told her that they receive Rx blank without signature and the last Rx I've faxed to [facility] is signed uploaded under media DME prescription    Re-faxed DME Rx  Signed 4/4/18 to [facility] with last office visit

## 2018-04-24 ENCOUNTER — TELEPHONE (OUTPATIENT)
Dept: SLEEP MEDICINE | Facility: CLINIC | Age: 73
End: 2018-04-24

## 2018-05-30 DIAGNOSIS — M54.2 NECK PAIN: Primary | ICD-10-CM

## 2018-06-12 ENCOUNTER — HOSPITAL ENCOUNTER (OUTPATIENT)
Dept: RADIOLOGY | Facility: HOSPITAL | Age: 73
Discharge: HOME | End: 2018-06-12
Attending: PHYSICIAN ASSISTANT
Payer: MEDICARE

## 2018-06-12 ENCOUNTER — OFFICE VISIT (OUTPATIENT)
Dept: ORTHOPEDICS | Facility: CLINIC | Age: 73
End: 2018-06-12
Payer: MEDICARE

## 2018-06-12 VITALS — HEIGHT: 66 IN | WEIGHT: 273.06 LBS | BODY MASS INDEX: 43.88 KG/M2

## 2018-06-12 DIAGNOSIS — M50.30 DDD (DEGENERATIVE DISC DISEASE), CERVICAL: Primary | ICD-10-CM

## 2018-06-12 DIAGNOSIS — M54.2 NECK PAIN: ICD-10-CM

## 2018-06-12 PROCEDURE — 72050 X-RAY EXAM NECK SPINE 4/5VWS: CPT | Mod: 26,,, | Performed by: RADIOLOGY

## 2018-06-12 PROCEDURE — 99204 OFFICE O/P NEW MOD 45 MIN: CPT | Mod: S$PBB,,, | Performed by: PHYSICIAN ASSISTANT

## 2018-06-12 PROCEDURE — 99999 PR PBB SHADOW E&M-EST. PATIENT-LVL III: CPT | Mod: PBBFAC,,, | Performed by: PHYSICIAN ASSISTANT

## 2018-06-12 PROCEDURE — 72050 X-RAY EXAM NECK SPINE 4/5VWS: CPT | Mod: TC

## 2018-06-12 PROCEDURE — 99213 OFFICE O/P EST LOW 20 MIN: CPT | Mod: PBBFAC,25 | Performed by: PHYSICIAN ASSISTANT

## 2018-06-12 NOTE — LETTER
June 12, 2018      Delroy Berrios MD  [address] [facility] [address]           [facility]  [address]  [place] [address]  Phone: [phone]          Patient: Conchis Ford   MR Number: [id number]   YOB: 1945   Date of Visit: 6/12/2018       Dear Delroy Berrios:    Thank you for referring Conchis Ford to me for evaluation. Attached you will find relevant portions of my assessment and plan of care.    If you have questions, please do not hesitate to call me. I look forward to following Conchis Ford along with you.    Sincerely,    Flaca Jose PA-C    Enclosure  CC:  No Recipients    If you would like to receive this communication electronically, please contact [email or web address] or [phone] to request more information on [employer] access.    For providers and/or their staff who would like to refer a patient to [facility], please contact us through our one-stop-shop provider referral line, Jolly Braun, at [phone].    If you feel you have received this communication in error or would no longer like to receive these types of communications, please e-mail [email or web address]

## 2018-06-12 NOTE — PROGRESS NOTES
DATE: 6/12/2018  PATIENT: Conchis Ford    Supervising Physician: Skip Bradley M.D.    CHIEF COMPLAINT: neck pain    HISTORY:  Conchis Ford is a 72 y.o. female here for initial evaluation of neck pain (Neck - 5, Arm - 0). The pain has been present for about 4 years intermittently but most recently worsened over the last 2-3 months. The patient describes the pain as aching. The pain is worse with activity and during the day and improved at night. There is associated numbness and tingling in the middle and ring fingers bilaterally. There is no subjective weakness. Prior treatments have included aleve, robaxin, naproxen and ice, but no ESIs or surgery.     The patient reports myelopathic symptoms such as handwriting changes.  Denies difficulty with buttons/coins/keys. Denies perineal paresthesias, bowel/bladder dysfunction.    PAST MEDICAL/SURGICAL HISTORY:  Past Medical History:   Diagnosis Date    Anemia     Arthritis     Asthma     Dizziness     Hay fever     Hyperlipidemia     Hypertension     Obesity      Past Surgical History:   Procedure Laterality Date    HYSTERECTOMY      KNEE SURGERY      septiplasty  09/91       Medications:  Current Outpatient Prescriptions on File Prior to Visit   Medication Sig Dispense Refill    amlodipine (NORVASC) 10 MG tablet 10 mg once daily.   2    ascorbic acid, vitamin C, (VITAMIN C) 1000 MG tablet Take 1,000 mg by mouth once daily.      aspirin (ECOTRIN) 81 MG EC tablet Take 81 mg by mouth once daily.      b complex vitamins capsule Take 1 capsule by mouth once daily.      brimonidine 0.2% (ALPHAGAN) 0.2 % Drop 3 (three) times daily.   4    budesonide-formoterol 160-4.5 mcg (SYMBICORT) 160-4.5 mcg/actuation HFAA Inhale 2 puffs into the lungs every 12 (twelve) hours. Controller      BYSTOLIC 10 mg Tab 10 mg once daily.   1    cyanocobalamin (VITAMIN B-12) 1000 MCG tablet Take 100 mcg by mouth once daily.      fluticasone (FLONASE) 50 mcg/actuation nasal  spray SPRAY ONCE  IN EACH NOSTRIL   BID  3    hydrochlorothiazide (HYDRODIURIL) 25 MG tablet Take 25 mg by mouth once daily.   2    lovastatin (MEVACOR) 20 MG tablet TK 1 T PO QD  2    metformin (GLUCOPHAGE) 1000 MG tablet 1,000 mg 2 (two) times daily with meals.   2    montelukast (SINGULAIR) 10 mg tablet       multivitamin capsule Take 1 capsule by mouth once daily.      naproxen sodium (ANAPROX) 220 MG tablet Take 220 mg by mouth 2 (two) times daily with meals.      potassium chloride SA (K-DUR,KLOR-CON) 20 MEQ tablet Take 20 mEq by mouth once daily.   1    PROAIR HFA 90 mcg/actuation inhaler INL 2 PFS PO Q 6 H PRF SOB OR WHZ  2    ranitidine (ZANTAC) 150 MG tablet Take 150 mg by mouth 2 (two) times daily.      vitamin D 1000 units Tab Take 5,000 Units by mouth once daily.      fexofenadine (ALLEGRA) 180 MG tablet Take 180 mg by mouth once daily.       No current facility-administered medications on file prior to visit.        Social History:   Social History     Social History    Marital status:      Spouse name: N/A    Number of children: N/A    Years of education: N/A     Occupational History    Not on file.     Social History Main Topics    Smoking status: Former Smoker     Packs/day: 1.00     Years: 30.00     Types: Cigarettes    Smokeless tobacco: Never Used    Alcohol use No    Drug use: No    Sexual activity: Not on file     Other Topics Concern    Not on file     Social History Narrative    No narrative on file       REVIEW OF SYSTEMS:  Constitution: Negative. Negative for chills, fever and night sweats.   Cardiovascular: Negative for chest pain and syncope.   Respiratory: Negative for cough and shortness of breath.   Gastrointestinal: See HPI. Negative for nausea/vomiting. Negative for abdominal pain.  Genitourinary: See HPI. Negative for discoloration or dysuria.  Skin: Negative for dry skin, itching and rash.   Hematologic/Lymphatic: Negative for bleeding problem. Does  "not bruise/bleed easily.   Musculoskeletal: Negative for falls and muscle weakness.   Neurological: See HPI. No seizures.   Endocrine: Negative for polydipsia, polyphagia and polyuria.   Allergic/Immunologic: Negative for hives and persistent infections.  Psychiatric/Behavioral: Negative for depression and insomnia.         EXAM:  Ht 5' 5.5" (1.664 m)   Wt 123.9 kg (273 lb 0.6 oz)   BMI 44.75 kg/m²     General: The patient is a very pleasant 72 y.o. female in no apparent distress, the patient is oriented to person, place and time.  Psych: Normal mood and affect  HEENT: Vision grossly intact, hearing intact to the spoken word.  Lungs: Respirations unlabored.  Gait: Normal station and gait, no difficulty with toe or heel walk.   Skin: Cervical skin negative for rashes, lesions, hairy patches and surgical scars.  Range of motion: Cervical range of motion is acceptable. There is mild tenderness to palpation.  Spinal Balance: Global saggital and coronal spinal balance acceptable, no significant for scoliosis and kyphosis.  Musculoskeletal: No pain with the range of motion of the bilateral shoulders and elbows. Normal bulk and contour of the bilateral hands.  Vascular: Bilateral hands warm and well perfused, radial pulses 2+ bilaterally.  Neurological: Normal strength and tone in all major motor groups in the bilateral upper and lower extremities. Normal sensation to light touch in the C5-T1 and L2-S1 dermatomes bilaterally with the exception of decreased sensation in the C7 dermatome on the right.  Deep tendon reflexes symmetric 2+ in the bilateral upper and lower extremities.  Negative Inverted Radial Reflex and Bryant's bilaterally. Negative Babinski bilaterally.     IMAGING:   Today I personally reviewed AP, Lat and Flex/Ex  upright C-spine films that demonstrate moderate degenerative changes throughout the cervical spine with trace anterolisthesis of C3/4 and C4/5.       Body mass index is 44.75 kg/m².    No " results found for: HGBA1C        ASSESSMENT/PLAN:    Diagnoses and all orders for this visit:    DDD (degenerative disc disease), cervical  -     MRI Cervical Spine Without Contrast; Future    MRI cervical spine.  Follow up after the MRI to discuss results and further treatment.     We discussed the department policy regarding pain medications.  Patient understands and agrees.         Follow-up if symptoms worsen or fail to improve.

## 2018-06-15 ENCOUNTER — HOSPITAL ENCOUNTER (OUTPATIENT)
Dept: RADIOLOGY | Facility: HOSPITAL | Age: 73
Discharge: HOME | End: 2018-06-15
Attending: PHYSICIAN ASSISTANT
Payer: MEDICARE

## 2018-06-15 DIAGNOSIS — M50.30 DDD (DEGENERATIVE DISC DISEASE), CERVICAL: ICD-10-CM

## 2018-06-15 PROCEDURE — 72141 MRI NECK SPINE W/O DYE: CPT | Mod: TC

## 2018-06-15 PROCEDURE — 72141 MRI NECK SPINE W/O DYE: CPT | Mod: 26,,, | Performed by: RADIOLOGY

## 2018-06-19 ENCOUNTER — OFFICE VISIT (OUTPATIENT)
Dept: ORTHOPEDICS | Facility: CLINIC | Age: 73
End: 2018-06-19
Payer: MEDICARE

## 2018-06-19 VITALS — WEIGHT: 273.13 LBS | HEIGHT: 66 IN | BODY MASS INDEX: 43.9 KG/M2

## 2018-06-19 DIAGNOSIS — M50.30 DDD (DEGENERATIVE DISC DISEASE), CERVICAL: Primary | ICD-10-CM

## 2018-06-19 DIAGNOSIS — M54.2 NECK PAIN: ICD-10-CM

## 2018-06-19 PROCEDURE — 99999 PR PBB SHADOW E&M-EST. PATIENT-LVL III: CPT | Mod: PBBFAC,,, | Performed by: PHYSICIAN ASSISTANT

## 2018-06-19 PROCEDURE — 99213 OFFICE O/P EST LOW 20 MIN: CPT | Mod: PBBFAC | Performed by: PHYSICIAN ASSISTANT

## 2018-06-19 PROCEDURE — 99213 OFFICE O/P EST LOW 20 MIN: CPT | Mod: S$PBB,,, | Performed by: PHYSICIAN ASSISTANT

## 2018-06-19 RX ORDER — CYCLOBENZAPRINE HCL 5 MG
5-10 TABLET ORAL 3 TIMES DAILY PRN
Qty: 30 TABLET | Refills: 0 | Status: SHIPPED | OUTPATIENT
Start: 2018-06-19 | End: 2018-06-29

## 2018-06-19 NOTE — PROGRESS NOTES
"DATE: 6/19/2018  PATIENT: Conchis Ford    Attending Physician: Skip Bradley M.D.    HISTORY:  Conchis Ford is a 72 y.o. female who returns to me today for MRI results.  She was last seen by me 6/12/2018.  Today she is doing well but notes she borrowed her friends TENs unit last night and it gave her some relief.    The patient reports myelopathic symptoms such as handwriting changes.  Denies difficulty with buttons/coins/keys. Denies perineal paresthesias, bowel/bladder dysfunction.    PMH/PSH/FamHx/SocHx:  Unchanged from prior visit    ROS:  REVIEW OF SYSTEMS:  Constitution: Negative. Negative for chills, fever and night sweats.   HENT: Negative for congestion and headaches.    Eyes: Negative for blurred vision, left vision loss and right vision loss.   Cardiovascular: Negative for chest pain and syncope.   Respiratory: Negative for cough and shortness of breath.    Endocrine: Negative for polydipsia, polyphagia and polyuria.   Hematologic/Lymphatic: Negative for bleeding problem. Does not bruise/bleed easily.   Skin: Negative for dry skin, itching and rash.   Musculoskeletal: Negative for falls and muscle weakness.   Gastrointestinal: Negative for abdominal pain and bowel incontinence.   Allergic/Immunologic: Negative for hives and persistent infections.  Genitourinary: Negative for urinary retention/incontinence and nocturia.   Neurological: Negative for disturbances in coordination, no myelopathic symptoms such as handwriting changes or difficulty with buttons, coins, keys or small objects. No loss of balance and seizures.   Psychiatric/Behavioral: Negative for depression. The patient does not have insomnia.   Denies myelopathic symptoms, perineal paresthesias, bowel or bladder incontinence    EXAM:  Ht 5' 5.5" (1.664 m)   Wt 123.9 kg (273 lb 2.4 oz)   BMI 44.76 kg/m²     Physical exam stable.  Neuro exam stable.     IMAGING:  No new imaging today.    Today I personally re-reviewed AP, Lat and Flex/Ex "  upright C-spine films that demonstrate moderate degenerative changes throughout the cervical spine with trace anterolisthesis of C3/4 and C4/5.     MRI cervical spine demonstrates moderate right and mild left neural foraminal narrowing at C5/6.     Body mass index is 44.76 kg/m².    No results found for: HGBA1C      ASSESSMENT/PLAN:    Diagnoses and all orders for this visit:    DDD (degenerative disc disease), cervical  -     HME - OTHER    Neck pain  -     HME - OTHER    Other orders  -     cyclobenzaprine (FLEXERIL) 5 MG tablet; Take 1-2 tablets (5-10 mg total) by mouth 3 (three) times daily as needed for Muscle spasms.        Discussed with Dr. Bradley.  Will treat conservatively first.  The patient does not want to go to PT at this time.  Order placed for her to have a TENs unit at home.  Follow up as needed.       Follow-up if symptoms worsen or fail to improve.

## 2018-09-18 ENCOUNTER — PATIENT MESSAGE (OUTPATIENT)
Dept: SLEEP MEDICINE | Facility: CLINIC | Age: 73
End: 2018-09-18

## 2018-10-30 ENCOUNTER — OFFICE VISIT (OUTPATIENT)
Dept: SLEEP MEDICINE | Facility: CLINIC | Age: 73
End: 2018-10-30
Payer: MEDICARE

## 2018-10-30 VITALS
HEART RATE: 76 BPM | BODY MASS INDEX: 45.4 KG/M2 | WEIGHT: 272.5 LBS | DIASTOLIC BLOOD PRESSURE: 83 MMHG | SYSTOLIC BLOOD PRESSURE: 162 MMHG | HEIGHT: 65 IN

## 2018-10-30 DIAGNOSIS — G47.33 OBSTRUCTIVE SLEEP APNEA: Primary | ICD-10-CM

## 2018-10-30 PROCEDURE — 99214 OFFICE O/P EST MOD 30 MIN: CPT | Mod: PBBFAC | Performed by: NURSE PRACTITIONER

## 2018-10-30 PROCEDURE — 99214 OFFICE O/P EST MOD 30 MIN: CPT | Mod: S$PBB,,, | Performed by: NURSE PRACTITIONER

## 2018-10-30 PROCEDURE — 99999 PR PBB SHADOW E&M-EST. PATIENT-LVL IV: CPT | Mod: PBBFAC,,, | Performed by: NURSE PRACTITIONER

## 2018-10-30 NOTE — PROGRESS NOTES
Cocnhis Ford  was seen in follow-up for RAMESH management and CPAP equipment check.     CHIEF COMPLAINT:    Chief Complaint   Patient presents with    Sleep Apnea       INTERVAL HISTORY:    10/30/2018 RILEY Pickens NP: Continues to use CPAP nightly without breakthrough symptoms. No issues with Margarita FFM. However, has dry mouth despite heated tubing. Puts mint in mouth before starting therapy to help moisten oral cavity.   The patient is adherent on CPAP and experiencing symptomatic benefit. ESS 8.     CPAP Interrogation: Dreamstation CPAP 14 cm, Percentage of Days >=4 Hours:  96.7% , Average AHI:  1.3 , Average % of Night in PB:  0.5%       11/07/2017 RILEY SaavedraP: Pt returns after set up of CPAP machine at [facility] on 10/06/2017.  CPAP machine order originally sent to [facility] 9/[facility], but [facility] had delay set up so Rx sent to [facility] instead. She has also gotten SoClean CPAP  machine, which she likes a lot. Pt reports that snoring, interrupted sleep, excessive daytime sleepiness, excessive daytime fatigue, witnessed apnea, and unrefreshing sleep now resolved with CPAP use. Denies pressure intolerance. Denies nasal drying. Reports rare mild oral drying despite using chinstrap with nasal gel pillow mask.  ESS form not completed. Pt right arm in sling because of radius fracture, scheduled for surgery 11/13.     CPAP Interrogation: did not bring CPAP machine  DreamStation CPAP 14 cm  Compliance Summary Days with Device Usage: 30 days Percentage of Days >=4 Hours: 100.0% Average Usage (Days Used): 7 hrs. 45 mins. 12 secs. Average Usage (All Days): 7 hrs. 45 mins. 12 secs.  Apnea Indices Average AHI: 2.4 Average OA Index: 0.8 Average CA Index: 0.2   Large Leak Average Time in Large Leak: 22 mins. 42 secs. Average % of Night in Large Leak: 4.9%   Periodic Breathing Average % of Night in PB: 0.2%     09/05/2017 RILEY Pickens NP: Discussed 08/19/2017 in-lab sleep study results. Pt complaints of snoring, interrupted  "sleep, excessive daytime sleepiness, excessive daytime fatigue, witnessed apnea, and unrefreshing sleep remain the same. ESS 13.      08/08/2017 RILEY Pickens NP: HISTORY OF PRESENT ILLNESS: Conchis Ford is a 73 y.o. female is here for sleep evaluation.       Patient complaints include: snoring, interrupted sleep, excessive daytime sleepiness, excessive daytime fatigue, witnessed apnea (since 1996) and unrefreshing sleep.   "I wake up drained like I didn't sleep"     Wears upper and lower partials. Has broken dental bridges in the past due to chronic teeth grinding. Removes partials before bed. Stopped wearing mouth guard 2 years ago.     Denies symptoms of restless legs or kicking during sleep.    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    SLEEP ROUTINE:    Bed partner:    Time to bed:  10 pm before work day, 10:30 pm before off day  Sleep onset latency:  10 minutes  Disruptions or awakenings:  3 - 4 x    Wakeup time:    6 a - 7 am  Perceived sleep quality:  2/5        Baseline Sleep Study: 08/19/2017 Split night study 268 lb. The overall AHI was 11.8 with an oxygen izabella of 77.0%. Effective control of sleep disordered breathing was seen during supine REM stage sleep at a pressure of 14 cm of water.    PAST MEDICAL HISTORY:    Active Ambulatory Problems     Diagnosis Date Noted    Severe obesity (BMI >= 40) 08/09/2016    Hypertension 08/09/2016    Chest pain, non-cardiac 08/09/2016    RAMESH (obstructive sleep apnea)      Resolved Ambulatory Problems     Diagnosis Date Noted    Sleep apnea, unspecified      Past Medical History:   Diagnosis Date    Anemia     Arthritis     Asthma     Dizziness     Hay fever     Hyperlipidemia     Hypertension     Obesity                 PAST SURGICAL HISTORY:    Past Surgical History:   Procedure Laterality Date    HYSTERECTOMY      KNEE SURGERY      " septiplasty  09/91         FAMILY HISTORY:                No family history on file.    SOCIAL HISTORY:          Tobacco:   Social History     Tobacco Use   Smoking Status Former Smoker    Packs/day: 1.00    Years: 30.00    Pack years: 30.00    Types: Cigarettes   Smokeless Tobacco Never Used       Alcohol use:    Social History     Substance and Sexual Activity   Alcohol Use No    Alcohol/week: 0.0 oz                 ALLERGIES:    Review of patient's allergies indicates:   Allergen Reactions    Ace inhibitors Itching    Ibuprofen Swelling     Lip swelling    Penicillins Hives       CURRENT MEDICATIONS:    Current Outpatient Medications   Medication Sig Dispense Refill    amlodipine (NORVASC) 10 MG tablet 10 mg once daily.   2    ascorbic acid, vitamin C, (VITAMIN C) 1000 MG tablet Take 1,000 mg by mouth once daily.      aspirin (ECOTRIN) 81 MG EC tablet Take 81 mg by mouth once daily.      b complex vitamins capsule Take 1 capsule by mouth once daily.      budesonide-formoterol 160-4.5 mcg (SYMBICORT) 160-4.5 mcg/actuation HFAA Inhale 2 puffs into the lungs every 12 (twelve) hours. Controller      BYSTOLIC 10 mg Tab 10 mg once daily.   1    cyanocobalamin (VITAMIN B-12) 1000 MCG tablet Take 100 mcg by mouth once daily.      fluticasone (FLONASE) 50 mcg/actuation nasal spray SPRAY ONCE  IN EACH NOSTRIL   BID  3    hydrochlorothiazide (HYDRODIURIL) 25 MG tablet Take 25 mg by mouth once daily.   2    lovastatin (MEVACOR) 20 MG tablet TK 1 T PO QD  2    metformin (GLUCOPHAGE) 1000 MG tablet 1,000 mg 2 (two) times daily with meals.   2    montelukast (SINGULAIR) 10 mg tablet       multivitamin capsule Take 1 capsule by mouth once daily.      naproxen sodium (ANAPROX) 220 MG tablet Take 220 mg by mouth 2 (two) times daily with meals.      potassium chloride SA (K-DUR,KLOR-CON) 20 MEQ tablet Take 20 mEq by mouth once daily.   1    PROAIR HFA 90 mcg/actuation inhaler INL 2 PFS PO Q 6 H PRF SOB OR  "WHZ  2    ranitidine (ZANTAC) 150 MG tablet Take 150 mg by mouth 2 (two) times daily.      vitamin D 1000 units Tab Take 5,000 Units by mouth once daily.      brimonidine 0.2% (ALPHAGAN) 0.2 % Drop 3 (three) times daily.   4    fexofenadine (ALLEGRA) 180 MG tablet Take 180 mg by mouth once daily.       No current facility-administered medications for this visit.                   REVIEW OF SYSTEMS:     Sleep related symptoms as per HPI.  CONST:Denies weight gain    HEENT: Denies sinus congestion; sometimes dry mouth in AM  PULM: Sometimesdyspnea  CARD:  Reports palpitations   GI:  Reports acid reflux  : Denies polyuria  NEURO: Denies headaches; reports dizziness  PSYCH: Denies mood disturbance  HEME: Denies anemia    Otherwise, a balance of systems reviewed is negative.          PHYSICAL EXAM:  BP (!) 162/83 (BP Location: Right arm, Patient Position: Sitting)   Pulse 76   Ht 5' 5" (1.651 m)   Wt 123.6 kg (272 lb 7.8 oz)   BMI 45.34 kg/m²   GENERAL: Obese body habitus, well groomed    ASSESSMENT:    Obstructive sleep apnea, mild by AHI. The patient symptomatically has snoring, interrupted sleep, excessive daytime sleepiness, excessive daytime fatigue, witnessed apnea and unrefreshing sleep now resolved with CPAP use. The patient is adherent on CPAP and experiencing symptomatic benefit. Medical co-morbidities: essential HTN, asthma, DM2, HLD, and morbid obesity.  This warrants treatment for obstructive sleep apnea.      Bruxism, chronic, monitored     Allergic rhinitis, chronic, mostly controlled with daily Flonase and Zyrtec; congestion could make acclimating to PAP therapy more difficult     Morbid obesity, BMI >40, discussed relationship between weight and RAMESH    PLAN:    Treatment:    -continue CPAP 14 cm. Updated Rx for supplies. Stop using mint to go to sleep, caution about choking hazard. Recommend trying Xylimelts for further management of xerostomia in addition to heated tubing.  RTC 12 months, " sooner if needed.     Education: During our discussion today, we talked about the etiology of obstructive sleep apnea as well as the potential ramifications of untreated sleep apnea, which could include daytime sleepiness, hypertension, heart disease and/or stroke. We discussed potential treatment options, which could include weight loss, body positioning, continuous positive airway pressure (CPAP), OA, EPAP,  or referral for surgical consideration.     Precautions: The patient was advised to abstain from driving should they feel sleepy  or drowsy.     Counseling regarding benefits of healthy eating and physical activity (150 minutes of moderate-intensity aerobic activity per week) for weight reduction which can improve overall health.

## 2019-07-30 ENCOUNTER — TELEPHONE (OUTPATIENT)
Dept: SLEEP MEDICINE | Facility: CLINIC | Age: 74
End: 2019-07-30

## 2019-09-27 ENCOUNTER — PATIENT MESSAGE (OUTPATIENT)
Dept: SLEEP MEDICINE | Facility: CLINIC | Age: 74
End: 2019-09-27

## 2019-10-01 ENCOUNTER — OFFICE VISIT (OUTPATIENT)
Dept: SLEEP MEDICINE | Facility: CLINIC | Age: 74
End: 2019-10-01
Payer: MEDICARE

## 2019-10-01 VITALS
BODY MASS INDEX: 44.63 KG/M2 | HEIGHT: 65 IN | SYSTOLIC BLOOD PRESSURE: 136 MMHG | HEART RATE: 69 BPM | DIASTOLIC BLOOD PRESSURE: 79 MMHG | WEIGHT: 267.88 LBS

## 2019-10-01 DIAGNOSIS — G47.33 OBSTRUCTIVE SLEEP APNEA: Primary | ICD-10-CM

## 2019-10-01 DIAGNOSIS — G47.33 OSA (OBSTRUCTIVE SLEEP APNEA): ICD-10-CM

## 2019-10-01 PROCEDURE — 99214 PR OFFICE/OUTPT VISIT, EST, LEVL IV, 30-39 MIN: ICD-10-PCS | Mod: S$PBB,,, | Performed by: NURSE PRACTITIONER

## 2019-10-01 PROCEDURE — 99999 PR PBB SHADOW E&M-EST. PATIENT-LVL IV: CPT | Mod: PBBFAC,,, | Performed by: NURSE PRACTITIONER

## 2019-10-01 PROCEDURE — 99999 PR PBB SHADOW E&M-EST. PATIENT-LVL IV: ICD-10-PCS | Mod: PBBFAC,,, | Performed by: NURSE PRACTITIONER

## 2019-10-01 PROCEDURE — 99214 OFFICE O/P EST MOD 30 MIN: CPT | Mod: PBBFAC | Performed by: NURSE PRACTITIONER

## 2019-10-01 PROCEDURE — 99214 OFFICE O/P EST MOD 30 MIN: CPT | Mod: S$PBB,,, | Performed by: NURSE PRACTITIONER

## 2019-10-01 NOTE — PROGRESS NOTES
Conchis Ford  was seen in follow-up for RAMESH management and CPAP equipment check.     CHIEF COMPLAINT:  RAMESH annual f/u    INTERVAL HISTORY:    10/01/2019 RILEY Pickens NP: Continues to use CPAP nightly without breakthrough symptoms. Reports air leaks towards eyes from Margarita FFM. Otherwise no other issues.     Dreamstation CPAP 14 cm, 4777.4/4804.8, > 4 hours: 27/30, Predicted AHI: 1.9, MF: 92%, PB: 0%, 30-day ave: 5.9 hours     10/30/2018 RILEY Pickens NP: Continues to use CPAP nightly without breakthrough symptoms. No issues with Marg[facility]ita [facility]. However, has dry mouth despite heated tubing. Puts mint in mouth before starting therapy to help moisten oral cavity.   The patient is adherent on CPAP and experiencing symptomatic benefit. ESS 8.     CPAP Interrogation: Dreamstation CPAP 14 cm, Percentage of Days >=4 Hours:  96.7% , Average AHI:  1.3 , Average % of Night in PB:  0.5%       11/07/2017 RILEY Hastings: Pt returns after set up of CPAP machine at [facility] on 10/06/2017.  CPAP machine order originally sent to [facility] 9/5, but [facility] had delay set up so Rx sent to [facility] instead. She has also gotten [employer] CPAP  machine, which she likes a lot. Pt reports that snoring, interrupted sleep, excessive daytime sleepiness, excessive daytime fatigue, witnessed apnea, and unrefreshing sleep now resolved with CPAP use. Denies pressure intolerance. Denies nasal drying. Reports rare mild oral drying despite using chinstrap with nasal gel pillow mask.  ESS form not completed. Pt right arm in sling because of radius fracture, scheduled for surgery 11/13.     CPAP Interrogation: did not bring CPAP machine  DreamStation CPAP 14 cm  Compliance Summary Days with Device Usage: 30 days Percentage of Days >=4 Hours: 100.0% Average Usage (Days Used): 7 hrs. 45 mins. 12 secs. Average Usage (All Days): 7 hrs. 45 mins. 12 secs.  Apnea Indices Average AHI: 2.4 Average OA Index: 0.8 Average CA Index: 0.2   Large Leak Average Time in  "Large Leak: 22 mins. 42 secs. Average % of Night in Large Leak: 4.9%   Periodic Breathing Average % of Night in PB: 0.2%     09/05/2017 RILEY Pickens NP: Discussed 08/19/2017 in-lab sleep study results. Pt complaints of snoring, interrupted sleep, excessive daytime sleepiness, excessive daytime fatigue, witnessed apnea, and unrefreshing sleep remain the same. ESS 13.      08/08/2017 RILEY Pickens NP: HISTORY OF PRESENT ILLNESS: Conchis Ford is a 73 y.o. female is here for sleep evaluation.       Patient complaints include: snoring, interrupted sleep, excessive daytime sleepiness, excessive daytime fatigue, witnessed apnea (since 1996) and unrefreshing sleep.   "I wake up drained like I didn't sleep"     Wears upper and lower partials. Has broken dental bridges in the past due to chronic teeth grinding. Removes partials before bed. Stopped wearing mouth guard 2 years ago.     Denies symptoms of restless legs or kicking during sleep.    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    SLEEP ROUTINE:    Bed partner:    Time to bed:  10 pm before work day, 10:30 pm before off day  Sleep onset latency:  10 minutes  Disruptions or awakenings:  3 - 4 x    Wakeup time:    6 a - 7 am  Perceived sleep quality:  2/5        Baseline Sleep Study: 08/19/2017 Split night study 268 lb. The overall AHI was 11.8 with an oxygen izabella of 77.0%. Effective control of sleep disordered breathing was seen during supine REM stage sleep at a pressure of 14 cm of water.    PAST MEDICAL HISTORY:    Active Ambulatory Problems     Diagnosis Date Noted    Severe obesity (BMI >= 40) 08/09/2016    Hypertension 08/09/2016    Chest pain, non-cardiac 08/09/2016    RAMESH (obstructive sleep apnea)      Resolved Ambulatory Problems     Diagnosis Date Noted    Sleep apnea, unspecified      Past Medical History:   Diagnosis Date    Anemia     Arthritis  "    Asthma     Dizziness     Hay fever     Hyperlipidemia     Obesity                 PAST SURGICAL HISTORY:    Past Surgical History:   Procedure Laterality Date    HYSTERECTOMY      KNEE SURGERY      septiplasty  09/91         FAMILY HISTORY:                History reviewed. No pertinent family history.    SOCIAL HISTORY:          Tobacco:   Social History     Tobacco Use   Smoking Status Former Smoker    Packs/day: 1.00    Years: 30.00    Pack years: 30.00    Types: Cigarettes   Smokeless Tobacco Never Used       Alcohol use:    Social History     Substance and Sexual Activity   Alcohol Use No    Alcohol/week: 0.0 standard drinks                 ALLERGIES:    Review of patient's allergies indicates:   Allergen Reactions    Ace inhibitors Itching    Ibuprofen Swelling     Lip swelling    Penicillins Hives       CURRENT MEDICATIONS:    Current Outpatient Medications   Medication Sig Dispense Refill    amlodipine (NORVASC) 10 MG tablet 10 mg once daily.   2    ascorbic acid, vitamin C, (VITAMIN C) 1000 MG tablet Take 1,000 mg by mouth once daily.      aspirin (ECOTRIN) 81 MG EC tablet Take 81 mg by mouth once daily.      b complex vitamins capsule Take 1 capsule by mouth once daily.      brimonidine 0.2% (ALPHAGAN) 0.2 % Drop 3 (three) times daily.   4    budesonide-formoterol 160-4.5 mcg (SYMBICORT) 160-4.5 mcg/actuation HFAA Inhale 2 puffs into the lungs every 12 (twelve) hours. Controller      BYSTOLIC 10 mg Tab 10 mg once daily.   1    cyanocobalamin (VITAMIN B-12) 1000 MCG tablet Take 100 mcg by mouth once daily.      fexofenadine (ALLEGRA) 180 MG tablet Take 180 mg by mouth once daily.      fluticasone (FLONASE) 50 mcg/actuation nasal spray SPRAY ONCE  IN EACH NOSTRIL   BID  3    hydrochlorothiazide (HYDRODIURIL) 25 MG tablet Take 25 mg by mouth once daily.   2    lovastatin (MEVACOR) 20 MG tablet TK 1 T PO QD  2    metformin (GLUCOPHAGE) 1000 MG tablet 1,000 mg 2 (two) times daily  "with meals.   2    montelukast (SINGULAIR) 10 mg tablet       multivitamin capsule Take 1 capsule by mouth once daily.      naproxen sodium (ANAPROX) 220 MG tablet Take 220 mg by mouth 2 (two) times daily with meals.      potassium chloride SA (K-DUR,KLOR-CON) 20 MEQ tablet Take 20 mEq by mouth once daily.   1    PROAIR HFA 90 mcg/actuation inhaler INL 2 PFS PO Q 6 H PRF SOB OR WHZ  2    ranitidine (ZANTAC) 150 MG tablet Take 150 mg by mouth 2 (two) times daily.      vitamin D 1000 units Tab Take 5,000 Units by mouth once daily.       No current facility-administered medications for this visit.                   REVIEW OF SYSTEMS:     Sleep related symptoms as per HPI.  CONST:Denies weight gain    HEENT: Denies sinus congestion; sometimes dry mouth in AM  PULM: Sometimesdyspnea  CARD:  Reports palpitations   GI:  Reports acid reflux  : Denies polyuria  NEURO: Denies headaches; reports dizziness  PSYCH: Denies mood disturbance  HEME: Denies anemia    Otherwise, a balance of systems reviewed is negative.          PHYSICAL EXAM:  /79   Pulse 69   Ht 5' 5" (1.651 m)   Wt 121.5 kg (267 lb 13.7 oz)   BMI 44.57 kg/m²   GENERAL: Obese body habitus, well groomed    ASSESSMENT:    Obstructive sleep apnea, mild by AHI. The patient symptomatically has snoring, interrupted sleep, excessive daytime sleepiness, excessive daytime fatigue, witnessed apnea and unrefreshing sleep now resolved with CPAP use. The patient is adherent on CPAP and experiencing symptomatic benefit. Medical co-morbidities: essential HTN, asthma, DM2, HLD, and morbid obesity.  This warrants treatment for obstructive sleep apnea.      Bruxism, chronic, monitored     Allergic rhinitis, chronic, mostly controlled with daily Flonase and Zyrtec; congestion could make acclimating to PAP therapy more difficult     Morbid obesity, BMI >40, discussed relationship between weight and RAMESH    PLAN:    Treatment:    -continue CPAP 14 cm. Updated Rx for " supplies. At next refill, consider either [facility] FFM or Resmed Airfit F30.   RTC 12 months, sooner if needed.     Education: During our discussion today, we talked about the etiology of obstructive sleep apnea as well as the potential ramifications of untreated sleep apnea, which could include daytime sleepiness, hypertension, heart disease and/or stroke. We discussed potential treatment options, which could include weight loss, body positioning, continuous positive airway pressure (CPAP), OA, EPAP,  or referral for surgical consideration.     Precautions: The patient was advised to abstain from driving should they feel sleepy  or drowsy.     Counseling regarding benefits of healthy eating and physical activity (150 minutes of moderate-intensity aerobic activity per week) for weight reduction which can improve overall health.

## 2019-10-01 NOTE — PATIENT INSTRUCTIONS
Consider:  1) [facility] FFM or  2) Resmed Airfit F30    *Ask for Dionisio Frank       [facility] FFM      Resmed Airfit F30

## 2020-01-09 ENCOUNTER — TELEPHONE (OUTPATIENT)
Dept: SLEEP MEDICINE | Facility: CLINIC | Age: 75
End: 2020-01-09

## 2020-10-26 ENCOUNTER — OFFICE VISIT (OUTPATIENT)
Dept: SLEEP MEDICINE | Facility: CLINIC | Age: 75
End: 2020-10-26
Payer: MEDICARE

## 2020-10-26 VITALS
DIASTOLIC BLOOD PRESSURE: 86 MMHG | BODY MASS INDEX: 45.26 KG/M2 | WEIGHT: 271.63 LBS | HEART RATE: 74 BPM | HEIGHT: 65 IN | SYSTOLIC BLOOD PRESSURE: 159 MMHG

## 2020-10-26 DIAGNOSIS — F45.8 BRUXISM: ICD-10-CM

## 2020-10-26 DIAGNOSIS — E66.01 SEVERE OBESITY (BMI >= 40): ICD-10-CM

## 2020-10-26 DIAGNOSIS — G47.33 OSA (OBSTRUCTIVE SLEEP APNEA): Primary | ICD-10-CM

## 2020-10-26 DIAGNOSIS — J30.9 CHRONIC ALLERGIC RHINITIS: ICD-10-CM

## 2020-10-26 PROCEDURE — 99999 PR PBB SHADOW E&M-EST. PATIENT-LVL IV: CPT | Mod: PBBFAC,,, | Performed by: NURSE PRACTITIONER

## 2020-10-26 PROCEDURE — 99214 OFFICE O/P EST MOD 30 MIN: CPT | Mod: PBBFAC | Performed by: NURSE PRACTITIONER

## 2020-10-26 PROCEDURE — 99212 OFFICE O/P EST SF 10 MIN: CPT | Mod: S$PBB,,, | Performed by: NURSE PRACTITIONER

## 2020-10-26 PROCEDURE — 99999 PR PBB SHADOW E&M-EST. PATIENT-LVL IV: ICD-10-PCS | Mod: PBBFAC,,, | Performed by: NURSE PRACTITIONER

## 2020-10-26 PROCEDURE — 99212 PR OFFICE/OUTPT VISIT, EST, LEVL II, 10-19 MIN: ICD-10-PCS | Mod: S$PBB,,, | Performed by: NURSE PRACTITIONER

## 2020-10-26 NOTE — PROGRESS NOTES
Conchis Ford 75 y.o. year-old female returns for management of obstructive sleep apnea and CPAP equipment check.     INTERVAL HISTORY:    10/26/2020 RILEY Pickens NP: No mask or pressure issues. Using SoClean without issues. Wishes to get more uninterrupted sleep, but often has to  daughter at 11:30 pm while she's already asleep.  ESS 7.     9/25/2020 - 10/24/2020, CPAP 14 cm, 30 days used, > 4 hrs: 96.7%, Predicted AHI: 3.0      10/01/2019 RILEY Pickens NP: Continues to use CPAP nightly without breakthrough symptoms. Reports air leaks towards eyes from [facility]FM. Otherwise no other issues.      Dreamstation CPAP 14 cm, 4777.4/4804.8, > 4 hours: 27/30, Predicted AHI: 1.9, MF: 92%, PB: 0%, 30-day ave: 5.9 hours      10/30/2018 RILEY Pickens NP: Continues to use CPAP nightly without breakthrough symptoms. No issues with [facility] FFM. However, has dry mouth despite heated tubing. Puts mint in mouth before starting therapy to help moisten oral cavity.   The patient is adherent on CPAP and experiencing symptomatic benefit. ESS 8.      CPAP Interrogation: Dreamstation CPAP 14 cm, Percentage of Days >=4 Hours:  96.7% , Average AHI:  1.3 , Average % of Night in PB:  0.5%        11/07/2017 RILEY Hastings: Pt returns after set up of CPAP machine at [facility] on 10/06/2017.  CPAP machine order originally sent to [facility], but [facility] had delay set up so Rx sent to [facility] instead. She has also gotten SoClean CPAP  machine, which she likes a lot. Pt reports that snoring, interrupted sleep, excessive daytime sleepiness, excessive daytime fatigue, witnessed apnea, and unrefreshing sleep now resolved with CPAP use. Denies pressure intolerance. Denies nasal drying. Reports rare mild oral drying despite using chinstrap with nasal gel pillow mask.  ESS form not completed. Pt right arm in sling because of radius fracture, scheduled for surgery 11/13.      CPAP Interrogation: did not bring CPAP machine  DreamStation CPAP 14  "cm  Compliance Summary Days with Device Usage: 30 days Percentage of Days >=4 Hours: 100.0% Average Usage (Days Used): 7 hrs. 45 mins. 12 secs. Average Usage (All Days): 7 hrs. 45 mins. 12 secs.  Apnea Indices Average AHI: 2.4 Average OA Index: 0.8 Average CA Index: 0.2   Large Leak Average Time in Large Leak: 22 mins. 42 secs. Average % of Night in Large Leak: 4.9%   Periodic Breathing Average % of Night in PB: 0.2%      09/05/2017 RILEY Pickens NP: Discussed 08/19/2017 in-lab sleep study results. Pt complaints of snoring, interrupted sleep, excessive daytime sleepiness, excessive daytime fatigue, witnessed apnea, and unrefreshing sleep remain the same. ESS 13.       08/08/2017 RILEY Pickens NP: HISTORY OF PRESENT ILLNESS: Conchis Ford is a 73 y.o. female is here for sleep evaluation.        Patient complaints include: snoring, interrupted sleep, excessive daytime sleepiness, excessive daytime fatigue, witnessed apnea (since 1996) and unrefreshing sleep.   "I wake up drained like I didn't sleep"     Wears upper and lower partials. Has broken dental bridges in the past due to chronic teeth grinding. Removes partials before bed. Stopped wearing mouth guard 2 years ago.      Denies symptoms of restless legs or kicking during sleep.     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     SLEEP ROUTINE:     Bed partner:    Time to bed:  10 pm before work day, 10:30 pm before off day  Sleep onset latency:  10 minutes  Disruptions or awakenings:  3 - 4 x    Wakeup time:    6 a - 7 am  Perceived sleep quality:  2/5         Baseline Sleep Study: 08/19/2017 Split night study 268 lb. The overall AHI was 11.8 with an oxygen izabella of 77.0%. Effective control of sleep disordered breathing was seen during supine REM stage sleep at a pressure of 14 cm of water.       Review of Systems: Sleep related symptoms as per HPI. Otherwise, a " "balance of 10 systems reviewed is negative.    Physical Exam:   BP (!) 159/86   Pulse 74   Ht 5' 5" (1.651 m)   Wt 123.2 kg (271 lb 9.7 oz)   BMI 45.20 kg/m²   GENERAL: Well groomed    Assessment:     Obstructive sleep apnea, mild by AHI. The patient symptomatically has snoring, interrupted sleep, excessive daytime sleepiness, excessive daytime fatigue, witnessed apnea and unrefreshing sleep now resolved with CPAP use. The patient is adherent on CPAP and experiencing symptomatic benefit. Medical co-morbidities: essential HTN, asthma, DM2, HLD, and morbid obesity.  This warrants treatment for obstructive sleep apnea.       Bruxism, chronic, assessed, non-issue      Allergic rhinitis, chronic, mostly controlled with daily Flonase and Zyrtec; congestion could make acclimating to PAP therapy more difficult      Morbid obesity, BMI >40, discussed relationship between weight and RAMESH    Plan:     Continue CPAP therapy at 14 cm H2O. Discussed FDA recommendation against SoClean use, use at her own risk. Increase opportunity for sleep.     Education: During our discussion today, we talked about the etiology of obstructive sleep apnea as well as the potential ramifications of untreated sleep apnea, which could include daytime sleepiness, hypertension, heart disease and/or stroke. We discussed potential treatment options, which could include weight loss, body positioning, continuous positive airway pressure (CPAP), or referral for surgical consideration.     Behavior modification which includes losing weight, exercising, changing the sleep position, abstaining from alcohol, and avoiding certain medications    Precautions: The patient was advised to abstain from driving should they feel sleepy or drowsy    RTC in 12 months, sooner if needed.          "

## 2021-03-26 ENCOUNTER — TELEPHONE (OUTPATIENT)
Dept: ENDOSCOPY | Facility: HOSPITAL | Age: 76
End: 2021-03-26

## 2021-04-20 ENCOUNTER — TELEPHONE (OUTPATIENT)
Dept: ENDOSCOPY | Facility: HOSPITAL | Age: 76
End: 2021-04-20

## 2021-10-05 ENCOUNTER — OFFICE VISIT (OUTPATIENT)
Dept: SLEEP MEDICINE | Facility: CLINIC | Age: 76
End: 2021-10-05
Payer: COMMERCIAL

## 2021-10-05 ENCOUNTER — PATIENT MESSAGE (OUTPATIENT)
Dept: SLEEP MEDICINE | Facility: CLINIC | Age: 76
End: 2021-10-05

## 2021-10-05 VITALS
HEIGHT: 65 IN | BODY MASS INDEX: 43.02 KG/M2 | DIASTOLIC BLOOD PRESSURE: 75 MMHG | SYSTOLIC BLOOD PRESSURE: 134 MMHG | WEIGHT: 258.19 LBS | HEART RATE: 82 BPM

## 2021-10-05 DIAGNOSIS — G47.33 OSA (OBSTRUCTIVE SLEEP APNEA): Primary | ICD-10-CM

## 2021-10-05 PROCEDURE — 99214 OFFICE O/P EST MOD 30 MIN: CPT | Mod: PBBFAC | Performed by: NURSE PRACTITIONER

## 2021-10-05 PROCEDURE — 99999 PR PBB SHADOW E&M-EST. PATIENT-LVL IV: ICD-10-PCS | Mod: PBBFAC,,, | Performed by: NURSE PRACTITIONER

## 2021-10-05 PROCEDURE — 99214 OFFICE O/P EST MOD 30 MIN: CPT | Mod: S$GLB,,, | Performed by: NURSE PRACTITIONER

## 2021-10-05 PROCEDURE — 99999 PR PBB SHADOW E&M-EST. PATIENT-LVL IV: CPT | Mod: PBBFAC,,, | Performed by: NURSE PRACTITIONER

## 2021-10-05 PROCEDURE — 99214 PR OFFICE/OUTPT VISIT, EST, LEVL IV, 30-39 MIN: ICD-10-PCS | Mod: S$GLB,,, | Performed by: NURSE PRACTITIONER

## 2021-10-05 RX ORDER — FLUVASTATIN SODIUM 80 MG/1
80 TABLET, FILM COATED, EXTENDED RELEASE ORAL DAILY
COMMUNITY
End: 2022-11-07

## 2021-12-31 ENCOUNTER — PATIENT MESSAGE (OUTPATIENT)
Dept: SLEEP MEDICINE | Facility: CLINIC | Age: 76
End: 2021-12-31
Payer: COMMERCIAL

## 2022-01-03 DIAGNOSIS — G47.33 OSA (OBSTRUCTIVE SLEEP APNEA): Primary | ICD-10-CM

## 2022-01-05 ENCOUNTER — TELEPHONE (OUTPATIENT)
Dept: SLEEP MEDICINE | Facility: CLINIC | Age: 77
End: 2022-01-05
Payer: COMMERCIAL

## 2022-01-05 NOTE — TELEPHONE ENCOUNTER
Spoke to Robert @ [facility] and informed him that we did received the fax and that I will have the provider so sigh it

## 2022-01-05 NOTE — TELEPHONE ENCOUNTER
Spoke to the pt and I explained to her that I have spoken to another provider and he is going to fill out the form and I will fax it tomorrow

## 2022-01-06 ENCOUNTER — TELEPHONE (OUTPATIENT)
Dept: SLEEP MEDICINE | Facility: CLINIC | Age: 77
End: 2022-01-06
Payer: COMMERCIAL

## 2022-01-06 NOTE — TELEPHONE ENCOUNTER
Patient's chart has been accessed to get progress notes to send to [facility] along with orders for new cpap machine.

## 2022-10-05 ENCOUNTER — TELEPHONE (OUTPATIENT)
Dept: SLEEP MEDICINE | Facility: CLINIC | Age: 77
End: 2022-10-05
Payer: COMMERCIAL

## 2022-10-05 NOTE — TELEPHONE ENCOUNTER
----- Message from Geetha Barnes sent at 10/5/2022 10:17 AM CDT -----  Name of Who is Calling:CHERELLE HILLMAN [[id number]]              What is the request in detail:Requesting a call back to be seen this week for a f/u              Can the clinic reply by [facility]:no              What Number to Call Back if not in [facility]:[phone]

## 2022-11-04 ENCOUNTER — OFFICE VISIT (OUTPATIENT)
Dept: PODIATRY | Facility: CLINIC | Age: 77
End: 2022-11-04
Payer: MEDICARE

## 2022-11-04 VITALS
SYSTOLIC BLOOD PRESSURE: 133 MMHG | HEIGHT: 65 IN | HEART RATE: 76 BPM | BODY MASS INDEX: 43.02 KG/M2 | WEIGHT: 258.19 LBS | DIASTOLIC BLOOD PRESSURE: 74 MMHG

## 2022-11-04 DIAGNOSIS — M20.11 VALGUS DEFORMITY OF BOTH GREAT TOES: ICD-10-CM

## 2022-11-04 DIAGNOSIS — M20.12 VALGUS DEFORMITY OF BOTH GREAT TOES: ICD-10-CM

## 2022-11-04 DIAGNOSIS — M79.671 FOOT PAIN, BILATERAL: ICD-10-CM

## 2022-11-04 DIAGNOSIS — E11.42 TYPE 2 DIABETES MELLITUS WITH DIABETIC POLYNEUROPATHY, UNSPECIFIED WHETHER LONG TERM INSULIN USE: Primary | ICD-10-CM

## 2022-11-04 DIAGNOSIS — M79.672 FOOT PAIN, BILATERAL: ICD-10-CM

## 2022-11-04 PROCEDURE — 99999 PR PBB SHADOW E&M-EST. PATIENT-LVL III: CPT | Mod: PBBFAC,,, | Performed by: PODIATRIST

## 2022-11-04 PROCEDURE — 99213 OFFICE O/P EST LOW 20 MIN: CPT | Mod: PBBFAC,PN | Performed by: PODIATRIST

## 2022-11-04 PROCEDURE — 99999 PR PBB SHADOW E&M-EST. PATIENT-LVL III: ICD-10-PCS | Mod: PBBFAC,,, | Performed by: PODIATRIST

## 2022-11-04 PROCEDURE — 99204 OFFICE O/P NEW MOD 45 MIN: CPT | Mod: S$PBB,,, | Performed by: PODIATRIST

## 2022-11-04 PROCEDURE — 99204 PR OFFICE/OUTPT VISIT, NEW, LEVL IV, 45-59 MIN: ICD-10-PCS | Mod: S$PBB,,, | Performed by: PODIATRIST

## 2022-11-04 RX ORDER — EVOLOCUMAB 140 MG/ML
140 INJECTION, SOLUTION SUBCUTANEOUS
COMMUNITY
Start: 2022-11-03 | End: 2023-11-27

## 2022-11-04 RX ORDER — LEVOCETIRIZINE DIHYDROCHLORIDE 5 MG/1
5 TABLET, FILM COATED ORAL DAILY PRN
COMMUNITY
Start: 2022-09-01

## 2022-11-04 RX ORDER — CETIRIZINE HYDROCHLORIDE 10 MG/1
10 TABLET, CHEWABLE ORAL
COMMUNITY

## 2022-11-04 RX ORDER — DULAGLUTIDE 0.75 MG/.5ML
INJECTION, SOLUTION SUBCUTANEOUS
COMMUNITY
Start: 2022-11-02

## 2022-11-04 RX ORDER — LIDOCAINE HYDROCHLORIDE 20 MG/ML
JELLY TOPICAL
Qty: 30 ML | Refills: 2 | Status: SHIPPED | OUTPATIENT
Start: 2022-11-04

## 2022-11-04 RX ORDER — DEXTROMETHORPHAN HYDROBROMIDE, GUAIFENESIN 5; 100 MG/5ML; MG/5ML
650 LIQUID ORAL EVERY 8 HOURS PRN
COMMUNITY

## 2022-11-04 RX ORDER — CHLORTHALIDONE 25 MG/1
25 TABLET ORAL EVERY MORNING
COMMUNITY
Start: 2022-09-01 | End: 2023-11-27

## 2022-11-04 RX ORDER — AZELASTINE 1 MG/ML
2 SPRAY, METERED NASAL 2 TIMES DAILY
COMMUNITY
Start: 2022-09-01

## 2022-11-04 RX ORDER — FAMOTIDINE 40 MG/1
40 TABLET, FILM COATED ORAL DAILY
COMMUNITY
Start: 2022-09-01

## 2022-11-04 NOTE — PROGRESS NOTES
Subjective:      Patient ID: Conchis Ford is a 77 y.o. female.    Chief Complaint: Foot Pain (Bilateral foot)    Diabetes, increased risk amputation needing evaluation/management/optomization of foot care.    Cc buring pain tips of toes.  Gradual onset, worsening over past several weeks, aggravated by increased weight bearing, shoe gear, pressure.  No previous medical treatment.  OTC pain med not helping. Denies trauma, surgey both feet.    Review of Systems   Constitutional: Negative for chills, diaphoresis, fever, malaise/fatigue and night sweats.   Cardiovascular:  Negative for claudication, cyanosis, leg swelling and syncope.   Skin:  Negative for color change, dry skin, nail changes, rash, suspicious lesions and unusual hair distribution.   Musculoskeletal:  Negative for falls, joint pain, joint swelling, muscle cramps, muscle weakness and stiffness.   Gastrointestinal:  Negative for constipation, diarrhea, nausea and vomiting.   Neurological:  Positive for sensory change. Negative for brief paralysis, disturbances in coordination, focal weakness, numbness, paresthesias and tremors.         Objective:      Physical Exam  Constitutional:       General: She is not in acute distress.     Appearance: She is well-developed. She is not diaphoretic.   Cardiovascular:      Pulses:           Popliteal pulses are 2+ on the right side and 2+ on the left side.        Dorsalis pedis pulses are 2+ on the right side and 2+ on the left side.        Posterior tibial pulses are 2+ on the right side and 2+ on the left side.      Comments: Capillary refill 3 seconds all toes/distal feet, all toes/both feet warm to touch.      Negative lymphadenopathy bilateral popliteal fossa and tarsal tunnel.      Negavie lower extremity edema bilateral.    Musculoskeletal:      Right ankle: No swelling, deformity, ecchymosis or lacerations. Normal range of motion. Normal pulse.      Right Achilles Tendon: Normal. No defects. Huang's test  negative.      Comments: Normal angle, base, station of gait. All ten toes without clubbing, cyanosis, or signs of ischemia.  No pain to palpation bilateral lower extremities.  Range of motion, stability, muscle strength, and muscle tone normal bilateral feet and legs.    Lymphadenopathy:      Lower Body: No right inguinal adenopathy. No left inguinal adenopathy.      Comments: Negative lymphadenopathy bilateral popliteal fossa and tarsal tunnel.    Negative lymphangitic streaking bilateral feet/ankles/legs.   Skin:     General: Skin is warm and dry.      Capillary Refill: Capillary refill takes 2 to 3 seconds.      Coloration: Skin is not pale.      Findings: No abrasion, bruising, burn, ecchymosis, erythema, laceration, lesion or rash.      Nails: There is no clubbing.      Comments: Skin is normal age and health appropriate color, turgor, texture, and temperature bilateral lower extremities without ulceration, hyperpigmentation, discoloration, masses nodules or cords palpated.  No ecchymosis, erythema, edema, or cardinal signs of infection bilateral lower extremities.    Nails normal color and trophic qualities.        Neurological:      Mental Status: She is alert and oriented to person, place, and time.      Sensory: No sensory deficit.      Motor: No tremor, atrophy or abnormal muscle tone.      Gait: Gait normal.      Deep Tendon Reflexes:      Reflex Scores:       Patellar reflexes are 2+ on the right side and 2+ on the left side.       Achilles reflexes are 2+ on the right side and 2+ on the left side.     Comments: Paresthesias, and burning bilateral feet with no clearly identified trigger or source.    Negative tinel sign to percussion sural, superficial peroneal, deep peroneal, saphenous, and posterior tibial nerves right and left ankles and feet.     Psychiatric:         Behavior: Behavior is cooperative.             Assessment:       Encounter Diagnoses   Name Primary?    Type 2 diabetes mellitus with  diabetic polyneuropathy, unspecified whether long term insulin use Yes    Foot pain, bilateral     Valgus deformity of both great toes          Plan:       Conchis JENSEN was seen today for foot pain.    Diagnoses and all orders for this visit:    Type 2 diabetes mellitus with diabetic polyneuropathy, unspecified whether long term insulin use  -      DIABETES FOOT EXAM  -     DIABETIC SHOES FOR HOME USE    Foot pain, bilateral  -      DIABETES FOOT EXAM  -     DIABETIC SHOES FOR HOME USE    Valgus deformity of both great toes  -      DIABETES FOOT EXAM  -     DIABETIC SHOES FOR HOME USE    Other orders  -     LIDOcaine HCL 2% (XYLOCAINE) 2 % jelly; Apply topically as needed. Apply topically once nightly to affected part of foot/feet.      I counseled the patient on her conditions, their implications and medical management.        The patient has received literature on basic diabetic foot care.  Patient will inspect feet daily, wear protective shoe gear when ambulatory, and apply moisturizer to skin as needed to maintain elasticity and help prevent ulceration.    Rx DM shoes, inserts    Lidocaine gel              No follow-ups on file.

## 2022-11-07 ENCOUNTER — OFFICE VISIT (OUTPATIENT)
Dept: SLEEP MEDICINE | Facility: CLINIC | Age: 77
End: 2022-11-07
Payer: MEDICARE

## 2022-11-07 DIAGNOSIS — I10 PRIMARY HYPERTENSION: ICD-10-CM

## 2022-11-07 DIAGNOSIS — E66.01 SEVERE OBESITY (BMI >= 40): ICD-10-CM

## 2022-11-07 DIAGNOSIS — G47.33 OSA (OBSTRUCTIVE SLEEP APNEA): Primary | ICD-10-CM

## 2022-11-07 PROCEDURE — 99203 PR OFFICE/OUTPT VISIT, NEW, LEVL III, 30-44 MIN: ICD-10-PCS | Mod: 95,,, | Performed by: INTERNAL MEDICINE

## 2022-11-07 PROCEDURE — 99203 OFFICE O/P NEW LOW 30 MIN: CPT | Mod: 95,,, | Performed by: INTERNAL MEDICINE

## 2022-11-07 NOTE — PROGRESS NOTES
The patient location is: [facility]   The chief complaint leading to consultation is: RAMESH    Visit type: audiovisual    Face to Face time with patient: 20  30 minutes of total time spent on the encounter, which includes face to face time and non-face to face time preparing to see the patient (eg, review of tests), Obtaining and/or reviewing separately obtained history, Documenting clinical information in the electronic or other health record, Independently interpreting results (not separately reported) and communicating results to the patient/family/caregiver, or Care coordination (not separately reported).         Each patient to whom he or she provides medical services by telemedicine is:  (1) informed of the relationship between the physician and patient and the respective role of any other health care provider with respect to management of the patient; and (2) notified that he or she may decline to receive medical services by telemedicine and may withdraw from such care at any time.    Notes:     Subjective:       Patient ID: Conchis Ford is a 77 y.o. female.    Chief Complaint: Sleep Apnea    HPI  Conchis Ford was diagnosed with Obstructive Sleep Apnea. Saw Celio last in Oct 2021. Had new CPAP issued Jan 2022 after recall for Dominique Dreamstation. Used So clean previously.  Also bought her own device.      Initial triggers for sleep study include  snoring, daytime hypersomnia.    Baseline Sleep Study: 08/19/2017 Split night study 268 lb. The overall AHI was 11.8 with an oxygen izabella of 77.0%. Effective control of sleep disordered breathing was seen during supine REM stage sleep at a pressure of 14 cm of water.      Bedtime 10:30 PM- 7:30 AM.   Watches TV in bed. Sometimes falls asleep without mask on.  No snoring.  No xerostomia with CPAP.      Patient Durable Medical Equipment (DME) company is [employer] [phone] ([employer]). [phone]      Device:    DreamStation 2  Advanced  [id number]  7/28/2022 - 11/5/2022  Setting:  CPAP 14      The interface is the Maginewear Full (Med) and it is comfortable.   Denies any metal implants.    According to the patient, the compliance with PAP is 4-6 hours per night, 6-7 nights per week.    PAP objective data downloaded from PAP device shows (CPT 26919):  Compliance:  Days used: 53/60  Days used > 4hrs: 52/60  Average use (hr): 6 hr 40 min  Pressure: 14  Leak:  19 L/min  Residual AHI: 2.5    Conchis Ford does feel refreshed upon awakening.   An assessment of daytime sleep tendency reveals that she does not have episodes of inadvertent dozing even when inactive or being sedentary.        Naps are  taken.  Naps  are helpful in minimizing diurnal hypersomnia.          Objective:      Physical Exam  Constitutional:       Appearance: Normal appearance.   Neurological:      General: No focal deficit present.      Mental Status: She is alert and oriented to person, place, and time.   Psychiatric:         Mood and Affect: Mood normal.         Behavior: Behavior normal.       Assessment:       Problem List Items Addressed This Visit          Cardiac/Vascular    Hypertension       Endocrine    Severe obesity (BMI >= 40)       Other    RAMESH (obstructive sleep apnea) - Primary       Plan:         Assessment & Plan  Obstructive Sleep Apnea:     Patient is on PAP therapy at this time  Excellent Compliance  Excellent Efficacy    - Teaching in regards to PAP use and mask adjustment was given to the patient during the visit today.  - Use PAP >4hours per night for 7 nights per week  - Use PAP for any daytime sleeping  - Interface patient chose that was prescribed today: Recommended to try AirFit F30i as it is similar interface as Dreamwear full  -  Prescription for PAP device supplies will be send to the [facility] today. Filter, mask, tube with climate line and humidification system.      Patient suffers from a complex medical condition and if sleep  disordered breathing is not properly treated it will increase her morbidity and mortality and patient is aware that has to be optimally compliant with therapy. Sleep disordered breathing has been associated with uncontrolled hypertension, insulin resistance, pulmonary hypertension, dementia, glaucoma, cardiac arrhythmias, cerebro vascular accident and multiple other conditions when not treated appropriately. Patient benefits from PAP therapy.      Follow up yearly

## 2022-11-08 ENCOUNTER — TELEPHONE (OUTPATIENT)
Dept: PODIATRY | Facility: CLINIC | Age: 77
End: 2022-11-08
Payer: COMMERCIAL

## 2022-11-08 NOTE — TELEPHONE ENCOUNTER
----- Message from Ja Greenfield DPM sent at 11/8/2022 10:25 AM CST -----  Regarding: Follow up appointment  1 month please     ThanksJa

## 2022-11-14 ENCOUNTER — TELEPHONE (OUTPATIENT)
Dept: PODIATRY | Facility: CLINIC | Age: 77
End: 2022-11-14
Payer: COMMERCIAL

## 2022-11-14 NOTE — TELEPHONE ENCOUNTER
Spoke with patient she stated she can not get lidocaine form pharmacy.                ----- Message from Yonny Garcia sent at 11/14/2022  8:44 AM CST -----      Name of Who is Calling: CHERELLE HILLMAN [[id number]]      What is the request in detail: Pt called to get another suggested script for LIDOcaine HCL 2% (XYLOCAINE) 2 % jelly.Please contact to further discuss and advise.          Can the clinic reply by [facility]: N      What Number to [facility] Back [facility]: [phone]

## 2022-12-06 ENCOUNTER — OFFICE VISIT (OUTPATIENT)
Dept: PODIATRY | Facility: CLINIC | Age: 77
End: 2022-12-06
Payer: MEDICARE

## 2022-12-06 VITALS
WEIGHT: 258 LBS | SYSTOLIC BLOOD PRESSURE: 155 MMHG | HEART RATE: 73 BPM | HEIGHT: 65 IN | BODY MASS INDEX: 42.99 KG/M2 | DIASTOLIC BLOOD PRESSURE: 70 MMHG

## 2022-12-06 DIAGNOSIS — B35.3 TINEA PEDIS OF BOTH FEET: Primary | ICD-10-CM

## 2022-12-06 DIAGNOSIS — M79.671 FOOT PAIN, BILATERAL: ICD-10-CM

## 2022-12-06 DIAGNOSIS — M20.41 HAMMER TOES OF BOTH FEET: ICD-10-CM

## 2022-12-06 DIAGNOSIS — E11.42 TYPE 2 DIABETES MELLITUS WITH DIABETIC POLYNEUROPATHY, UNSPECIFIED WHETHER LONG TERM INSULIN USE: ICD-10-CM

## 2022-12-06 DIAGNOSIS — M79.672 FOOT PAIN, BILATERAL: ICD-10-CM

## 2022-12-06 DIAGNOSIS — M20.42 HAMMER TOES OF BOTH FEET: ICD-10-CM

## 2022-12-06 PROCEDURE — 99999 PR PBB SHADOW E&M-EST. PATIENT-LVL V: ICD-10-PCS | Mod: PBBFAC,,, | Performed by: PODIATRIST

## 2022-12-06 PROCEDURE — 99999 PR PBB SHADOW E&M-EST. PATIENT-LVL V: CPT | Mod: PBBFAC,,, | Performed by: PODIATRIST

## 2022-12-06 PROCEDURE — 99215 OFFICE O/P EST HI 40 MIN: CPT | Mod: PBBFAC,PN | Performed by: PODIATRIST

## 2022-12-06 PROCEDURE — 99214 OFFICE O/P EST MOD 30 MIN: CPT | Mod: S$PBB,,, | Performed by: PODIATRIST

## 2022-12-06 PROCEDURE — 99214 PR OFFICE/OUTPT VISIT, EST, LEVL IV, 30-39 MIN: ICD-10-PCS | Mod: S$PBB,,, | Performed by: PODIATRIST

## 2022-12-06 RX ORDER — CICLOPIROX 7.7 MG/G
GEL TOPICAL 2 TIMES DAILY
Qty: 100 G | Refills: 3 | Status: SHIPPED | OUTPATIENT
Start: 2022-12-06

## 2022-12-06 RX ORDER — FUROSEMIDE 20 MG/1
TABLET ORAL
COMMUNITY
Start: 2022-11-29 | End: 2023-11-27

## 2022-12-06 NOTE — PROGRESS NOTES
Subjective:      Patient ID: Conchis Ford is a 77 y.o. female.    Chief Complaint: Follow-up    Buildup of skin, odor, moisture between toes 4 5 right sometimes aching burning deep sharp pain in this area as well  Gradual onset, worsening over past several weeks, aggravated by increased weight bearing, shoe gear, pressure.  No previous medical treatment.  OTC pain med not helping.     Review of Systems   Constitutional: Negative for chills, diaphoresis, fever, malaise/fatigue and night sweats.   Cardiovascular:  Negative for claudication, cyanosis, leg swelling and syncope.   Skin:  Positive for itching and suspicious lesions. Negative for color change, dry skin, nail changes, rash and unusual hair distribution.   Musculoskeletal:  Negative for falls, joint pain, joint swelling, muscle cramps, muscle weakness and stiffness.   Gastrointestinal:  Negative for constipation, diarrhea, nausea and vomiting.   Neurological:  Negative for brief paralysis, disturbances in coordination, focal weakness, numbness, paresthesias, sensory change and tremors.         Objective:      Physical Exam  Constitutional:       General: She is not in acute distress.     Appearance: She is well-developed. She is not diaphoretic.   Cardiovascular:      Pulses:           Popliteal pulses are 2+ on the right side and 2+ on the left side.        Dorsalis pedis pulses are 2+ on the right side and 2+ on the left side.        Posterior tibial pulses are 2+ on the right side and 2+ on the left side.      Comments: Capillary refill 3 seconds all toes/distal feet, all toes/both feet warm to touch.      Negative lymphadenopathy bilateral popliteal fossa and tarsal tunnel.      Negavie lower extremity edema bilateral.    Musculoskeletal:      Right ankle: No swelling, deformity, ecchymosis or lacerations. Normal range of motion. Normal pulse.      Right Achilles Tendon: Normal. No defects. Huang's test negative.      Comments: Adductovarus  hammertoe of the 4th and 5th toes the right foot 5 more than 4 reducible today.      Otherwise, Normal angle, base, station of gait. All ten toes without clubbing, cyanosis, or signs of ischemia.  No pain to palpation bilateral lower extremities.  Range of motion, stability, muscle strength, and muscle tone normal bilateral feet and legs.    Lymphadenopathy:      Lower Body: No right inguinal adenopathy. No left inguinal adenopathy.      Comments: Negative lymphadenopathy bilateral popliteal fossa and tarsal tunnel.    Negative lymphangitic streaking bilateral feet/ankles/legs.   Skin:     General: Skin is warm and dry.      Capillary Refill: Capillary refill takes 2 to 3 seconds.      Coloration: Skin is not pale.      Findings: No abrasion, bruising, burn, ecchymosis, erythema, laceration, lesion or rash.      Nails: There is no clubbing.      Comments: Most superficial skin slough with itching and skin buildup between toes 4 5 right without open skin drainage pus tracking fluctuance malodor signs of infection.      Otherwise, Skin is normal age and health appropriate color, turgor, texture, and temperature bilateral lower extremities without ulceration, hyperpigmentation, discoloration, masses nodules or cords palpated.  No ecchymosis, erythema, edema, or cardinal signs of infection bilateral lower extremities.    Nails normal color and trophic qualities.        Neurological:      Mental Status: She is alert and oriented to person, place, and time.      Sensory: No sensory deficit.      Motor: No tremor, atrophy or abnormal muscle tone.      Gait: Gait normal.      Deep Tendon Reflexes:      Reflex Scores:       Patellar reflexes are 2+ on the right side and 2+ on the left side.       Achilles reflexes are 2+ on the right side and 2+ on the left side.     Comments: Paresthesias, and burning bilateral feet with no clearly identified trigger or source.    Negative tinel sign to percussion sural, superficial  peroneal, deep peroneal, saphenous, and posterior tibial nerves right and left ankles and feet.     Psychiatric:         Behavior: Behavior is cooperative.           Assessment:       Encounter Diagnoses   Name Primary?    Tinea pedis of both feet Yes    Type 2 diabetes mellitus with diabetic polyneuropathy, unspecified whether long term insulin use     Foot pain, bilateral     Hammer toes of both feet          Plan:       Conchis JENSEN was seen today for follow-up.    Diagnoses and all orders for this visit:    Tinea pedis of both feet    Type 2 diabetes mellitus with diabetic polyneuropathy, unspecified whether long term insulin use    Foot pain, bilateral    Hammer toes of both feet    Other orders  -     ciclopirox 0.77 % Gel; Apply topically 2 (two) times daily.      I counseled the patient on her conditions, their implications and medical management.        Topical Loprox gel between the toes twice daily until resolved.      Discussed conservative treatment with shoes of adequate dimensions, material, and style to alleviate symptoms and delay or prevent surgical intervention.    Brief discussion of correction hammertoes 2 keep the hard callus from coming back in between 4th 5th toes right.    Conservative means until surgery warranted.          No follow-ups on file.

## 2023-09-21 ENCOUNTER — PATIENT MESSAGE (OUTPATIENT)
Dept: SLEEP MEDICINE | Facility: CLINIC | Age: 78
End: 2023-09-21
Payer: COMMERCIAL

## 2023-10-05 DIAGNOSIS — M25.511 BILATERAL SHOULDER PAIN: Primary | ICD-10-CM

## 2023-10-05 DIAGNOSIS — M25.512 BILATERAL SHOULDER PAIN: Primary | ICD-10-CM

## 2023-10-05 DIAGNOSIS — M54.6 LEFT-SIDED THORACIC BACK PAIN, UNSPECIFIED CHRONICITY: ICD-10-CM

## 2023-10-11 ENCOUNTER — CLINICAL SUPPORT (OUTPATIENT)
Dept: REHABILITATION | Facility: OTHER | Age: 78
End: 2023-10-11
Payer: MEDICARE

## 2023-10-11 DIAGNOSIS — M25.511 BILATERAL SHOULDER PAIN: ICD-10-CM

## 2023-10-11 DIAGNOSIS — M25.512 BILATERAL SHOULDER PAIN: ICD-10-CM

## 2023-10-11 DIAGNOSIS — R29.898 DECREASED STRENGTH OF UPPER EXTREMITY: ICD-10-CM

## 2023-10-11 DIAGNOSIS — M54.6 LEFT-SIDED THORACIC BACK PAIN, UNSPECIFIED CHRONICITY: ICD-10-CM

## 2023-10-11 DIAGNOSIS — M25.611 DECREASED ROM OF RIGHT SHOULDER: ICD-10-CM

## 2023-10-11 PROCEDURE — 97161 PT EVAL LOW COMPLEX 20 MIN: CPT | Mod: PN

## 2023-10-11 PROCEDURE — 97110 THERAPEUTIC EXERCISES: CPT | Mod: PN

## 2023-10-11 NOTE — PLAN OF CARE
[facility]   Physical Therapy Initial Evaluation      Name: Conchis Ford  [facility] Number: [id number]    Therapy Diagnosis:   Encounter Diagnoses   Name Primary?    Bilateral shoulder pain     Left-sided thoracic back pain, unspecified chronicity     Decreased ROM of right shoulder     Decreased strength of upper extremity         Physician: Henrry Che MD    Physician Orders: PT Eval and Treat   Medical Diagnosis from Referral:   M25.511,M25.512 (ICD-10-CM) - Bilateral shoulder pain   M54.6 (ICD-10-CM) - Left-sided thoracic back pain, unspecified chronicity     Evaluation Date: 10/11/2023  Authorization Period Expiration: 12/31/2023  Plan of Care Expiration: 1/3/2024  Progress Note Due: 11/11/2023  Visit # / Visits authorized: 1/ 1   FOTO: 0/ 3    Precautions: HTN     Time In: 2:30  Time Out: 3:20  Total Billable Time: 50 minutes    Subjective     Date of onset: 2 months    History of current condition - Conchis reports: She has been having right shoulder pain for about 2 months after sliding her arm across the wall. Her pain is in the right shoulder that radiates down to her right arm. The pain wakes her up at night. She cannot reach above 90 degrees of shoulder flexion and her pain is non stop. She has a lot of pain with getting dress or using her arm for ADL's. She does have numbness and tingling every now and then.     Falls: No    Imaging: none:     Prior Therapy: Yes  Social History:  lives alone, sometimes grandson  Occupation: Retired  Prior Level of Function: Pt could use upper extremities equally  Current Level of Function: Pt now cannot reach above 90 degrees shoulder flexion    Pain:  Current 7/10, worst 10/10, best 4/10   Location: right shoulder    Description: Aching and Throbbing  Aggravating Factors: reaching overhead, picking up items  Easing Factors: hot bath and rest    Patients goals: Pt would like to get out of pain.      Medical History:   Past Medical  History:   Diagnosis Date    Anemia     Arthritis     Asthma     Dizziness     Hay fever     Hyperlipidemia     Hypertension     Obesity        Surgical History:   Conchis Ford  has a past surgical history that includes Hysterectomy; Knee surgery; and septiplasty (09/91).    Medications:   Conchis JENSEN has a current medication list which includes the following prescription(s): acetaminophen, amlodipine, ascorbic acid (vitamin c), aspirin, azelastine, b complex vitamins, budesonide-formoterol 160-4.5 mcg, bystolic, cetirizine, chlorthalidone, ciclopirox, cyanocobalamin, famotidine, fluticasone propionate, furosemide, levocetirizine, lidocaine hcl 2%, lovastatin, metformin, montelukast, multivitamin, potassium chloride sa, proair hfa, repatha sureclick, trulicity, and vitamin d.    Allergies:   Review of patient's allergies indicates:   Allergen Reactions    Ace inhibitors Itching and Swelling    Brimonidine Other (See Comments)     Depression       Brinzolamide     Ibuprofen Swelling     Lip swelling    Penicillins Hives    Hydrocodone-acetaminophen Nausea And Vomiting        Objective        Observation: Pt was in visible pain during assessment      Pain = *  Passive Range of Motion:   Shoulder Right Left   Flexion 90* 160   Abduction 90* 120   ER at 0 50 70   ER at 90 45* 80   IR 20* 35      Active Range of Motion:   Shoulder Right Left   Flexion 90* 160   Abduction 90* 120   ER at 0 50 70   Reach behind head Unable T2   Reach behind back  Unable T10     Strength:  Shoulder Right Left   Flexion 3-/5 4/5   Abduction 3-/5 4/5   ER 3-/5 4/5   IR 4/5 4/5       Rotator Cuff Tear   Right Left   Hawkin's Shad + +   Drop Arm test + -   Resisted ER + -     Joint Mobility: Hypomobile posterior and inferior glide of shoulder    Sensation: Intact    Intake Outcome Measure for FOTO Shoulder Survey    Therapist reviewed FOTO scores for Conchis Ford on 10/11/2023.   FOTO report - see Media section or FOTO account episode  details.    Intake Score: 45%         Treatment     Total Treatment time (time-based codes) separate from Evaluation: 16 minutes     Conchis received the treatments listed below:      therapeutic exercises to develop endurance, ROM, and flexibility for 4 minutes including:  Table slides 2x10     neuromuscular re-education activities to improve: Coordination, Kinesthetic, and Sense for 12 minutes. The following activities were included:  YTB walkouts 2x10 ER/IR  Serratus press 2x10      Patient Education and Home Exercises     Education provided:   - HEP  - Role of PT    Written Home Exercises Provided: yes. Exercises were reviewed and Conchis was able to demonstrate them prior to the end of the session.  Conchis demonstrated good  understanding of the education provided. See EMR under Patient Instructions for exercises provided during therapy sessions.    Assessment     Conchis JENSEN is a 77 y.o. female referred to outpatient Physical Therapy with a medical diagnosis of   M25.511,M25.512 (ICD-10-CM) - Bilateral shoulder pain   M54.6 (ICD-10-CM) - Left-sided thoracic back pain, unspecified chronicity   . Patient presents with decreased right shoulder range of motion, decreased right shoulder joint mobility, decreased right upper extremity strength. She has signs and symptoms that point to possible rotator cuff tear. She demonstrated HEP during the session and had pain if she pushed intensity to high. Plan to see how patient responds to treatment then possibly refer back to primary if symptoms do not improve.     Patient prognosis is Good.   Patient will benefit from skilled outpatient Physical Therapy to address the deficits stated above and in the chart below, provide patient /family education, and to maximize patientt's level of independence.     Plan of care discussed with patient: Yes  Patient's spiritual, cultural and educational needs considered and patient is agreeable to the plan of care and goals as stated  below:     Anticipated Barriers for therapy: High irritability     Medical Necessity is demonstrated by the following  History  Co-morbidities and personal factors that may impact the plan of care [x] LOW: no personal factors / co-morbidities  [] MODERATE: 1-2 personal factors / co-morbidities  [] HIGH: 3+ personal factors / co-morbidities    Moderate / High Support Documentation:   Co-morbidities affecting plan of care:     Personal Factors:   no deficits     Examination  Body Structures and Functions, activity limitations and participation restrictions that may impact the plan of care [x] LOW: addressing 1-2 elements  [] MODERATE: 3+ elements  [] HIGH: 4+ elements (please support below)    Moderate / High Support Documentation:      Clinical Presentation [x] LOW: stable  [] MODERATE: Evolving  [] HIGH: Unstable     Decision Making/ Complexity Score: low       Goals:  Short Term Goals:  6 weeks  1.Report decreased right shoulder pain < / =  4/10  to increase tolerance for ADL's, dressing, showering  2. Increase PROM to 120 shoulder flexion    3. Increased strength by 1/3 MMT grade in right upper extremity to increase tolerance for ADL and work activities.  4. Pt to tolerate HEP to improve ROM and independence with ADL's    Long Term Goals: 12 weeks  1.Report decreased right shoulder pain  < / =  2 /10  to increase tolerance for ADL's, dressing, showering  2.Increase AROM to 140 shoulder flexion  3.Increase strength to >/= 4/5 in right upper extremity to increase tolerance for ADL and work activities.  4. Pt goal: Pt would like   5. Pt will have improved score of 61 on FOTO shoulder in order to demonstrate true functional improvement.     Plan     Plan of care Certification: 10/11/2023 to 1/3/2024.    Outpatient Physical Therapy 1-2 times weekly for 12 weeks to include the following interventions: Electrical Stimulation NMES, Manual Therapy, Neuromuscular Re-ed, Patient Education, Therapeutic Activities, and  Therapeutic Exercise.     Robinson Chester, PT, DPT        Physician's Signature: _________________________________________ Date: ________________

## 2023-10-18 ENCOUNTER — CLINICAL SUPPORT (OUTPATIENT)
Dept: REHABILITATION | Facility: OTHER | Age: 78
End: 2023-10-18
Payer: MEDICARE

## 2023-10-18 DIAGNOSIS — M25.611 DECREASED ROM OF RIGHT SHOULDER: Primary | ICD-10-CM

## 2023-10-18 DIAGNOSIS — R29.898 DECREASED STRENGTH OF UPPER EXTREMITY: ICD-10-CM

## 2023-10-18 PROCEDURE — 97110 THERAPEUTIC EXERCISES: CPT | Mod: PN

## 2023-10-18 PROCEDURE — 97112 NEUROMUSCULAR REEDUCATION: CPT | Mod: PN

## 2023-10-18 PROCEDURE — 97530 THERAPEUTIC ACTIVITIES: CPT | Mod: PN

## 2023-10-18 PROCEDURE — 97140 MANUAL THERAPY 1/> REGIONS: CPT | Mod: PN

## 2023-10-18 NOTE — PROGRESS NOTES
[facility]   Physical Therapy Treatment Note     Name: Conchis Ford  Clinic Number: [id number]    Therapy Diagnosis:   Encounter Diagnoses   Name Primary?    Decreased ROM of right shoulder Yes    Decreased strength of upper extremity      Physician: Henrry Che MD    Visit Date: 10/18/2023    Physician Orders: PT Eval and Treat   Medical Diagnosis from Referral:   M25.511,M25.512 (ICD-10-CM) - Bilateral shoulder pain   M54.6 (ICD-10-CM) - Left-sided thoracic back pain, unspecified chronicity      Evaluation Date: 10/11/2023  Authorization Period Expiration: 12/31/2023  Plan of Care Expiration: 1/3/2024  Progress Note Due: 11/11/2023  Visit # / Visits authorized: 1/ 20   FOTO: 0/ 3     Precautions: HTN      Time In: 10:01  Time Out: 10:58  Total Billable Time: 57 minutes      SUBJECTIVE     Pt reports: She has been having less pain when sleeping at night. Still having pain.     She was compliant with home exercise program.  Response to previous treatment: Decreased Pain  Functional change: Progressing    Pain: 6/10  Location: right shoulder      OBJECTIVE     Objective Measures updated at progress report unless specified.     Treatment       Conchis received the treatments listed below:      Therapeutic exercises to develop endurance, ROM, and flexibility for 12 minutes including:  Table slides 3x10   Shoulder flexion with dowel 3x10     Neuromuscular re-education activities to improve: Coordination, Kinesthetic, and Sense for 18 minutes. The following activities were included:  YTB walkouts 2x10 ER/IR  Serratus press 3x10  TB shoulder extension 3x12 TB  TB Row YTB 3x12  Supine shoulder perturbations 3x30s   - flexion 90  - ER/IR 45    Manual therapy techniques: Joint mobilizations were applied to the: right shoulder for 15 minutes, including:  Posterior glide of humerus grade 3  GH oscillations    Skilled PROM of shoulder    Therapeutic activities to improve functional  performance for 12  minutes, including:  Land Mine press 3x8  Pulleys 3x10 - flexion      Patient Education and Home Exercises     Home Exercises Provided and Patient Education Provided     Education provided:   - HEP    Written Home Exercises Provided: Patient instructed to cont prior HEP. Exercises were reviewed and Conchis was able to demonstrate them prior to the end of the session.  Conchis demonstrated good  understanding of the education provided. See EMR under Patient Instructions for exercises provided during therapy sessions    ASSESSMENT     Conchis was progress with shoulder range of motion exercises. She required verbal and tactile cues to perform exercises without compensation. She tolerated treatment with only minimal pain. Plan to progress as tolerated.     Conchis Is progressing well towards her goals.     Pt prognosis is Good.     Pt will continue to benefit from skilled outpatient physical therapy to address the deficits listed in the problem list box on initial evaluation, provide pt/family education and to maximize pt's level of independence in the home and community environment.     Pt's spiritual, cultural and educational needs considered and pt agreeable to plan of care and goals.     Anticipated Barriers for therapy: High irritability     Goals:  Short Term Goals:  6 weeks (Progressing, not met)  1.Report decreased right shoulder pain < / =  4/10  to increase tolerance for ADL's, dressing, showering  2. Increase PROM to 120 shoulder flexion    3. Increased strength by 1/3 MMT grade in right upper extremity to increase tolerance for ADL and work activities.  4. Pt to tolerate HEP to improve ROM and independence with ADL's     Long Term Goals: 12 weeks (Progressing, not met)  1.Report decreased right shoulder pain  < / =  2 /10  to increase tolerance for ADL's, dressing, showering  2.Increase AROM to 140 shoulder flexion  3.Increase strength to >/= 4/5 in right upper extremity to increase  tolerance for ADL and work activities.  4. Pt goal: Pt would like   5. Pt will have improved score of 61 on FOTO shoulder in order to demonstrate true functional improvement.      Plan      Plan of care Certification: 10/11/2023 to 1/3/2024.     Continue with PT plan of care with emphasis on shoulder AROM and PROM    Robinson Chester, PT, DPT

## 2023-10-20 ENCOUNTER — CLINICAL SUPPORT (OUTPATIENT)
Dept: REHABILITATION | Facility: OTHER | Age: 78
End: 2023-10-20
Payer: MEDICARE

## 2023-10-20 DIAGNOSIS — M25.611 DECREASED ROM OF RIGHT SHOULDER: Primary | ICD-10-CM

## 2023-10-20 DIAGNOSIS — R29.898 DECREASED STRENGTH OF UPPER EXTREMITY: ICD-10-CM

## 2023-10-20 PROCEDURE — 97530 THERAPEUTIC ACTIVITIES: CPT | Mod: PN

## 2023-10-20 PROCEDURE — 97110 THERAPEUTIC EXERCISES: CPT | Mod: PN

## 2023-10-20 PROCEDURE — 97140 MANUAL THERAPY 1/> REGIONS: CPT | Mod: PN

## 2023-10-20 PROCEDURE — 97112 NEUROMUSCULAR REEDUCATION: CPT | Mod: PN

## 2023-10-20 NOTE — PROGRESS NOTES
[facility]   Physical Therapy Treatment Note     Name: Conchis Ford  Clinic Number: [id number]    Therapy Diagnosis:   Encounter Diagnoses   Name Primary?    Decreased ROM of right shoulder Yes    Decreased strength of upper extremity        Physician: Henrry Che MD    Visit Date: 10/20/2023    Physician Orders: PT Eval and Treat   Medical Diagnosis from Referral:   M25.511,M25.512 (ICD-10-CM) - Bilateral shoulder pain   M54.6 (ICD-10-CM) - Left-sided thoracic back pain, unspecified chronicity      Evaluation Date: 10/11/2023  Authorization Period Expiration: 12/31/2023  Plan of Care Expiration: 1/3/2024  Progress Note Due: 11/11/2023  Visit # / Visits authorized: 2/ 20   FOTO: 0/ 3     Precautions: HTN      Time In: 10:02  Time Out: 11:00  Total Billable Time: 58 minutes      SUBJECTIVE     Pt reports: She is now able to reach overhead. Still has pain, but more ROM     She was compliant with home exercise program.  Response to previous treatment: Decreased Pain  Functional change: Progressing    Pain: 5/10  Location: right shoulder      OBJECTIVE     Objective Measures updated at progress report unless specified.   Passive Range of Motion:   Shoulder Right Left   Flexion 145* (10/20) 160   Abduction 90* 120   ER at 0 50 70   ER at 90 45* 80   IR 20* 35     Treatment       Conchis received the treatments listed below:      Therapeutic exercises to develop endurance, ROM, and flexibility for 13 minutes including:  Table walk backs 3x10   Shoulder flexion with dowel 3x10     Neuromuscular re-education activities to improve: Coordination, Kinesthetic, and Sense for 19 minutes. The following activities were included:  YTB walkouts 2x10 ER/IR  YTB ER/IR 12x   Serratus press 3x10  TB shoulder extension 3x12 YTB  TB Row GTB 3x12  Supine shoulder perturbations 3x30s   - flexion 90  - ER/IR 45    Manual therapy techniques: Joint mobilizations were applied to the: right shoulder for 14  minutes, including:  Posterior glide of humerus grade 3  GH oscillations    Skilled PROM of shoulder    Therapeutic activities to improve functional performance for 12 minutes, including:  Land Mine press 3x8  Pulleys 3x10 - flexion      Patient Education and Home Exercises     Home Exercises Provided and Patient Education Provided     Education provided:   - HEP    Written Home Exercises Provided: Patient instructed to cont prior HEP. Exercises were reviewed and Conchis was able to demonstrate them prior to the end of the session.  Conchis demonstrated good  understanding of the education provided. See EMR under Patient Instructions for exercises provided during therapy sessions    ASSESSMENT     Conchis presented to PT with improvements in shoulder range of motion compared to last session. She tolerated the exercises better with decrease pain compared to last session. She required verbal and tactile for exercises form. Plan to progress as tolerated.     Conchis Is progressing well towards her goals.     Pt prognosis is Good.     Pt will continue to benefit from skilled outpatient physical therapy to address the deficits listed in the problem list box on initial evaluation, provide pt/family education and to maximize pt's level of independence in the home and community environment.     Pt's spiritual, cultural and educational needs considered and pt agreeable to plan of care and goals.     Anticipated Barriers for therapy: High irritability     Goals:  Short Term Goals:  6 weeks (Progressing, not met)  1.Report decreased right shoulder pain < / =  4/10  to increase tolerance for ADL's, dressing, showering  2. Increase PROM to 120 shoulder flexion    3. Increased strength by 1/3 MMT grade in right upper extremity to increase tolerance for ADL and work activities.  4. Pt to tolerate HEP to improve ROM and independence with ADL's     Long Term Goals: 12 weeks (Progressing, not met)  1.Report decreased right  shoulder pain  < / =  2 /10  to increase tolerance for ADL's, dressing, showering  2.Increase AROM to 140 shoulder flexion  3.Increase strength to >/= 4/5 in right upper extremity to increase tolerance for ADL and work activities.  4. Pt goal: Pt would like   5. Pt will have improved score of 61 on FOTO shoulder in order to demonstrate true functional improvement.      Plan      Plan of care Certification: 10/11/2023 to 1/3/2024.     Continue with PT plan of care with emphasis on shoulder AROM and PROM    Robinson Chester, PT, DPT

## 2023-10-25 ENCOUNTER — CLINICAL SUPPORT (OUTPATIENT)
Dept: REHABILITATION | Facility: OTHER | Age: 78
End: 2023-10-25
Payer: MEDICARE

## 2023-10-25 DIAGNOSIS — R29.898 DECREASED STRENGTH OF UPPER EXTREMITY: ICD-10-CM

## 2023-10-25 DIAGNOSIS — M25.611 DECREASED ROM OF RIGHT SHOULDER: Primary | ICD-10-CM

## 2023-10-25 PROCEDURE — 97140 MANUAL THERAPY 1/> REGIONS: CPT | Mod: PN

## 2023-10-25 PROCEDURE — 97112 NEUROMUSCULAR REEDUCATION: CPT | Mod: PN

## 2023-10-25 NOTE — PROGRESS NOTES
[facility]   Physical Therapy Treatment Note     Name: Conchis Ford  Clinic Number: [id number]    Therapy Diagnosis:   Encounter Diagnoses   Name Primary?    Decreased ROM of right shoulder Yes    Decreased strength of upper extremity          Physician: Henrry Che MD    Visit Date: 10/25/2023    Physician Orders: PT Eval and Treat   Medical Diagnosis from Referral:   M25.511,M25.512 (ICD-10-CM) - Bilateral shoulder pain   M54.6 (ICD-10-CM) - Left-sided thoracic back pain, unspecified chronicity      Evaluation Date: 10/11/2023  Authorization Period Expiration: 12/31/2023  Plan of Care Expiration: 1/3/2024  Progress Note Due: 11/11/2023  Visit # / Visits authorized: 3/ 20   FOTO: 0/ 3     Precautions: HTN      Time In: 10:16  Time Out: 11:00  Total Billable Time: 30 minutes      SUBJECTIVE     Pt reports: She is slowly improving with shoulder ROM    She was compliant with home exercise program.  Response to previous treatment: Decreased Pain  Functional change: Progressing    Pain: 5/10  Location: right shoulder      OBJECTIVE     Objective Measures updated at progress report unless specified.   Passive Range of Motion:   Shoulder Right Left   Flexion 145* (10/20) 160   Abduction 90* 120   ER at 0 50 70   ER at 90 45* 80   IR 20* 35     Treatment       Conchis received the treatments listed below:      Therapeutic exercises to develop endurance, ROM, and flexibility for 9 minutes including:  Table walk backs 3x10   Shoulder flexion with dowel 3x10     Neuromuscular re-education activities to improve: Coordination, Kinesthetic, and Sense for 15 minutes. The following activities were included:  YTB ER/IR 3x12  Serratus press 3x10 3#  TB shoulder extension 3x12 YTB  TB Row GTB 3x12  Supine shoulder perturbations 3x30s   - flexion 90  - ER/IR 45      Manual therapy techniques: Joint mobilizations were applied to the: right shoulder for 9 minutes, including:  Posterior glide of  humerus grade 3  GH oscillations    Skilled PROM of shoulder    Therapeutic activities to improve functional performance for 11 minutes, including:  Land Mine press 3x8  Pulleys 3x10 - flexion      Patient Education and Home Exercises     Home Exercises Provided and Patient Education Provided     Education provided:   - HEP    Written Home Exercises Provided: Patient instructed to cont prior HEP. Exercises were reviewed and Conchis was able to demonstrate them prior to the end of the session.  Conchis demonstrated good  understanding of the education provided. See EMR under Patient Instructions for exercises provided during therapy sessions    ASSESSMENT     Conchis continues to show progress with shoulder range of motion improvement. She required verbal and tactile cues to perform exercises correctly without increase in symptoms of compensation. Plan to progress to cable pull down, and wall slides.     Conchis Is progressing well towards her goals.     Pt prognosis is Good.     Pt will continue to benefit from skilled outpatient physical therapy to address the deficits listed in the problem list box on initial evaluation, provide pt/family education and to maximize pt's level of independence in the home and community environment.     Pt's spiritual, cultural and educational needs considered and pt agreeable to plan of care and goals.     Anticipated Barriers for therapy: High irritability     Goals:  Short Term Goals:  6 weeks (Progressing, not met)  1.Report decreased right shoulder pain < / =  4/10  to increase tolerance for ADL's, dressing, showering  2. Increase PROM to 120 shoulder flexion    3. Increased strength by 1/3 MMT grade in right upper extremity to increase tolerance for ADL and work activities.  4. Pt to tolerate HEP to improve ROM and independence with ADL's     Long Term Goals: 12 weeks (Progressing, not met)  1.Report decreased right shoulder pain  < / =  2 /10  to increase tolerance for  ADL's, dressing, showering  2.Increase AROM to 140 shoulder flexion  3.Increase strength to >/= 4/5 in right upper extremity to increase tolerance for ADL and work activities.  4. Pt goal: Pt would like   5. Pt will have improved score of 61 on FOTO shoulder in order to demonstrate true functional improvement.      Plan      Plan of care Certification: 10/11/2023 to 1/3/2024.     Continue with PT plan of care with emphasis on shoulder AROM and PROM    Robinson Chester, PT, DPT

## 2023-10-27 ENCOUNTER — CLINICAL SUPPORT (OUTPATIENT)
Dept: REHABILITATION | Facility: OTHER | Age: 78
End: 2023-10-27
Payer: MEDICARE

## 2023-10-27 DIAGNOSIS — R29.898 DECREASED STRENGTH OF UPPER EXTREMITY: ICD-10-CM

## 2023-10-27 DIAGNOSIS — M25.611 DECREASED ROM OF RIGHT SHOULDER: Primary | ICD-10-CM

## 2023-10-27 PROCEDURE — 97530 THERAPEUTIC ACTIVITIES: CPT | Mod: PN

## 2023-10-27 PROCEDURE — 97140 MANUAL THERAPY 1/> REGIONS: CPT | Mod: PN

## 2023-10-27 NOTE — PROGRESS NOTES
[facility]   Physical Therapy Treatment Note     Name: Conchis Ford  Clinic Number: [id number]    Therapy Diagnosis:   Encounter Diagnoses   Name Primary?    Decreased ROM of right shoulder Yes    Decreased strength of upper extremity          Physician: Henrry Che MD    Visit Date: 10/27/2023    Physician Orders: PT Eval and Treat   Medical Diagnosis from Referral:   M25.511,M25.512 (ICD-10-CM) - Bilateral shoulder pain   M54.6 (ICD-10-CM) - Left-sided thoracic back pain, unspecified chronicity      Evaluation Date: 10/11/2023  Authorization Period Expiration: 12/31/2023  Plan of Care Expiration: 1/3/2024  Progress Note Due: 11/11/2023  Visit # / Visits authorized: 4/ 20   FOTO: 0/ 3     Precautions: HTN      Time In: 10:05  Time Out: 10:45  Total Billable Time: 30 minutes      SUBJECTIVE     Pt reports: She shoulder feels better and now just feels sore    She was compliant with home exercise program.  Response to previous treatment: Decreased Pain  Functional change: Progressing    Pain: 5/10  Location: right shoulder      OBJECTIVE     Objective Measures updated at progress report unless specified.   Passive Range of Motion:   Shoulder Right Left   Flexion 145* (10/20) 160   Abduction 90* 120   ER at 0 50 70   ER at 90 45* 80   IR 20* 35     Treatment       Conchis received the treatments listed below:      Therapeutic exercises to develop endurance, ROM, and flexibility for 4 minutes including:  Shoulder flexion with dowel 3x10     Neuromuscular re-education activities to improve: Coordination, Kinesthetic, and Sense for 16 minutes. The following activities were included:  YTB ER/IR 3x12  Serratus press 3x10 3#  TB shoulder extension 3x12 YTB  TB Row GTB 3x12      Manual therapy techniques: Joint mobilizations were applied to the: right shoulder for 8 minutes, including:  Posterior glide of humerus grade 3  GH oscillations    Skilled PROM of shoulder    Therapeutic  activities to improve functional performance for 22 minutes, including:  Land Mine press 3x10 3#  Pulleys 3x10 - flexion  Cable pulley pull downs 3x10 7#  Wall slides 3x10    Patient Education and Home Exercises     Home Exercises Provided and Patient Education Provided     Education provided:   - HEP    Written Home Exercises Provided: Patient instructed to cont prior HEP. Exercises were reviewed and Conchis was able to demonstrate them prior to the end of the session.  Conchis demonstrated good  understanding of the education provided. See EMR under Patient Instructions for exercises provided during therapy sessions    ASSESSMENT     Conchis continues was progressed with cable pull downs and weight on land mines. She tolerated the session well. Required verbal and tactile cues to perform exercises correctly without pain. Plan to progress as tolerated.     Conchis Is progressing well towards her goals.     Pt prognosis is Good.     Pt will continue to benefit from skilled outpatient physical therapy to address the deficits listed in the problem list box on initial evaluation, provide pt/family education and to maximize pt's level of independence in the home and community environment.     Pt's spiritual, cultural and educational needs considered and pt agreeable to plan of care and goals.     Anticipated Barriers for therapy: High irritability     Goals:  Short Term Goals:  6 weeks (Progressing, not met)  1.Report decreased right shoulder pain < / =  4/10  to increase tolerance for ADL's, dressing, showering  2. Increase PROM to 120 shoulder flexion    3. Increased strength by 1/3 MMT grade in right upper extremity to increase tolerance for ADL and work activities.  4. Pt to tolerate HEP to improve ROM and independence with ADL's     Long Term Goals: 12 weeks (Progressing, not met)  1.Report decreased right shoulder pain  < / =  2 /10  to increase tolerance for ADL's, dressing, showering  2.Increase AROM to 140  shoulder flexion  3.Increase strength to >/= 4/5 in right upper extremity to increase tolerance for ADL and work activities.  4. Pt goal: Pt would like   5. Pt will have improved score of 61 on FOTO shoulder in order to demonstrate true functional improvement.      Plan      Plan of care Certification: 10/11/2023 to 1/3/2024.     Continue with PT plan of care with emphasis on shoulder AROM and PROM    Robinson Chester, PT, DPT

## 2023-11-01 ENCOUNTER — CLINICAL SUPPORT (OUTPATIENT)
Dept: REHABILITATION | Facility: OTHER | Age: 78
End: 2023-11-01
Payer: MEDICARE

## 2023-11-01 DIAGNOSIS — M25.611 DECREASED ROM OF RIGHT SHOULDER: Primary | ICD-10-CM

## 2023-11-01 DIAGNOSIS — R29.898 DECREASED STRENGTH OF UPPER EXTREMITY: ICD-10-CM

## 2023-11-01 PROCEDURE — 97530 THERAPEUTIC ACTIVITIES: CPT | Mod: PN

## 2023-11-01 PROCEDURE — 97140 MANUAL THERAPY 1/> REGIONS: CPT | Mod: PN

## 2023-11-01 NOTE — PROGRESS NOTES
[facility]   Physical Therapy Treatment Note     Name: Conchis Ford  Clinic Number: [id number]    Therapy Diagnosis:   Encounter Diagnoses   Name Primary?    Decreased ROM of right shoulder Yes    Decreased strength of upper extremity            Physician: Henrry Che MD    Visit Date: 11/1/2023    Physician Orders: PT Eval and Treat   Medical Diagnosis from Referral:   M25.511,M25.512 (ICD-10-CM) - Bilateral shoulder pain   M54.6 (ICD-10-CM) - Left-sided thoracic back pain, unspecified chronicity      Evaluation Date: 10/11/2023  Authorization Period Expiration: 12/31/2023  Plan of Care Expiration: 1/3/2024  Progress Note Due: 11/11/2023  Visit # / Visits authorized: 5/ 20   FOTO: 0/ 3     Precautions: HTN      Time In: 9:55  Time Out: 10:50  Total Billable Time: 30 minutes      SUBJECTIVE     Pt reports: Her shoulder continues to feel better    She was compliant with home exercise program.  Response to previous treatment: Decreased Pain  Functional change: Progressing    Pain: 2/10  Location: right shoulder      OBJECTIVE     Objective Measures updated at progress report unless specified.   Passive Range of Motion:   Shoulder Right Left   Flexion 145* (10/20) 160   Abduction 90* 120   ER at 0 50 70   ER at 90 45* 80   IR 20* 35     Treatment       Conchis received the treatments listed below:      Therapeutic exercises to develop endurance, ROM, and flexibility for 0 minutes including:  Shoulder flexion with dowel 3x10     Neuromuscular re-education activities to improve: Coordination, Kinesthetic, and Sense for 19 minutes. The following activities were included:  YTB ER/IR 3x12  Serratus press 3x10 3#  Standing hitchhikers 2x10 3s holds  TB shoulder extension 3x12 YTB  TB Row GTB 3x12  Side lying er 3x12 1#      Manual therapy techniques: Joint mobilizations were applied to the: right shoulder for 9 minutes, including:  Posterior glide of humerus grade 3  GH oscillations     Skilled PROM of shoulder    Therapeutic activities to improve functional performance for 27 minutes, including:  Land Mine press 3x10 3#  Pulleys 3x10 - flexion  Cable pulley pull downs 3x10 7#  Wall slides 3x10  Seated Scaption raises 3x12    Patient Education and Home Exercises     Home Exercises Provided and Patient Education Provided     Education provided:   - HEP    Written Home Exercises Provided: Patient instructed to cont prior HEP. Exercises were reviewed and Conchis was able to demonstrate them prior to the end of the session.  Conchis demonstrated good  understanding of the education provided. See EMR under Patient Instructions for exercises provided during therapy sessions    ASSESSMENT     Conchis continues to shoulder improvement in her shoulder ROM. She was able to reach behind her back to about T12 and behind her head to about T1. She required verbal and tactile cues to perform exercises and tolerated the session well. Plan to progress as tolerated.       Conchis Is progressing well towards her goals.     Pt prognosis is Good.     Pt will continue to benefit from skilled outpatient physical therapy to address the deficits listed in the problem list box on initial evaluation, provide pt/family education and to maximize pt's level of independence in the home and community environment.     Pt's spiritual, cultural and educational needs considered and pt agreeable to plan of care and goals.     Anticipated Barriers for therapy: High irritability     Goals:  Short Term Goals:  6 weeks (Progressing, not met)  1.Report decreased right shoulder pain < / =  4/10  to increase tolerance for ADL's, dressing, showering  2. Increase PROM to 120 shoulder flexion    3. Increased strength by 1/3 MMT grade in right upper extremity to increase tolerance for ADL and work activities.  4. Pt to tolerate HEP to improve ROM and independence with ADL's     Long Term Goals: 12 weeks (Progressing, not met)  1.Report  decreased right shoulder pain  < / =  2 /10  to increase tolerance for ADL's, dressing, showering  2.Increase AROM to 140 shoulder flexion  3.Increase strength to >/= 4/5 in right upper extremity to increase tolerance for ADL and work activities.  4. Pt goal: Pt would like   5. Pt will have improved score of 61 on FOTO shoulder in order to demonstrate true functional improvement.      Plan      Plan of care Certification: 10/11/2023 to 1/3/2024.     Continue with PT plan of care with emphasis on shoulder AROM and PROM    Robinson Chester, PT, DPT

## 2023-11-03 ENCOUNTER — CLINICAL SUPPORT (OUTPATIENT)
Dept: REHABILITATION | Facility: OTHER | Age: 78
End: 2023-11-03
Payer: MEDICARE

## 2023-11-03 DIAGNOSIS — M25.611 DECREASED ROM OF RIGHT SHOULDER: Primary | ICD-10-CM

## 2023-11-03 DIAGNOSIS — R29.898 DECREASED STRENGTH OF UPPER EXTREMITY: ICD-10-CM

## 2023-11-03 PROCEDURE — 97140 MANUAL THERAPY 1/> REGIONS: CPT | Mod: PN

## 2023-11-03 PROCEDURE — 97530 THERAPEUTIC ACTIVITIES: CPT | Mod: PN

## 2023-11-03 NOTE — PROGRESS NOTES
[facility]   Physical Therapy Treatment Note     Name: Conchis Ford  Clinic Number: [id number]    Therapy Diagnosis:   Encounter Diagnoses   Name Primary?    Decreased ROM of right shoulder Yes    Decreased strength of upper extremity          Physician: Henrry Che MD    Visit Date: 11/3/2023    Physician Orders: PT Eval and Treat   Medical Diagnosis from Referral:   M25.511,M25.512 (ICD-10-CM) - Bilateral shoulder pain   M54.6 (ICD-10-CM) - Left-sided thoracic back pain, unspecified chronicity      Evaluation Date: 10/11/2023  Authorization Period Expiration: 12/31/2023  Plan of Care Expiration: 1/3/2024  Progress Note Due: 11/11/2023  Visit # / Visits authorized: 6/ 20   FOTO: 0/ 3     Precautions: HTN      Time In: 9:55  Time Out: 10:45  Total Billable Time: 40 minutes      SUBJECTIVE     Pt reports: Her shoulder has been aching since last session    She was compliant with home exercise program.  Response to previous treatment: Decreased Pain  Functional change: Progressing    Pain: 2/10  Location: right shoulder      OBJECTIVE     Objective Measures updated at progress report unless specified.   Passive Range of Motion:   Shoulder Right Left   Flexion 145* (10/20) 160   Abduction 90* 120   ER at 0 50 70   ER at 90 45* 80   IR 20* 35     Treatment       Conchis received the treatments listed below:      Therapeutic exercises to develop endurance, ROM, and flexibility for 8 minutes including:  Shoulder flexion with dowel 3x10  Dowel ER 2x10     Neuromuscular re-education activities to improve: Coordination, Kinesthetic, and Sense for 15 minutes. The following activities were included:  YTB ER/IR 3x12  Serratus press 3x10 3#  Standing hitchhikers 2x10 3s holds  TB shoulder extension 3x12 YTB  TB Row GTB 3x12  Side lying er 3x12 0#      Manual therapy techniques: Joint mobilizations were applied to the: right shoulder for 8 minutes, including:  Posterior glide of humerus grade  3  GH oscillations    Skilled PROM of shoulder    Therapeutic activities to improve functional performance for 19 minutes, including:  Land Mine press 3x10 3#  Pulleys 3x10 - flexion  Cable pulley pull downs 3x10 7#    Patient Education and Home Exercises     Home Exercises Provided and Patient Education Provided     Education provided:   - HEP    Written Home Exercises Provided: Patient instructed to cont prior HEP. Exercises were reviewed and Conchis was able to demonstrate them prior to the end of the session.  Conchis demonstrated good  understanding of the education provided. See EMR under Patient Instructions for exercises provided during therapy sessions    ASSESSMENT     Conchis presented to PT with increase symptoms compared to last session. Exercises were not progressed due to increase symptoms. Scaption raises and wall slides were not performed due to symptoms. Plan to progress as tolerated.       Conchis Is progressing well towards her goals.     Pt prognosis is Good.     Pt will continue to benefit from skilled outpatient physical therapy to address the deficits listed in the problem list box on initial evaluation, provide pt/family education and to maximize pt's level of independence in the home and community environment.     Pt's spiritual, cultural and educational needs considered and pt agreeable to plan of care and goals.     Anticipated Barriers for therapy: High irritability     Goals:  Short Term Goals:  6 weeks (Progressing, not met)  1.Report decreased right shoulder pain < / =  4/10  to increase tolerance for ADL's, dressing, showering  2. Increase PROM to 120 shoulder flexion    3. Increased strength by 1/3 MMT grade in right upper extremity to increase tolerance for ADL and work activities.  4. Pt to tolerate HEP to improve ROM and independence with ADL's     Long Term Goals: 12 weeks (Progressing, not met)  1.Report decreased right shoulder pain  < / =  2 /10  to increase tolerance for  ADL's, dressing, showering  2.Increase AROM to 140 shoulder flexion  3.Increase strength to >/= 4/5 in right upper extremity to increase tolerance for ADL and work activities.  4. Pt goal: Pt would like   5. Pt will have improved score of 61 on FOTO shoulder in order to demonstrate true functional improvement.      Plan      Plan of care Certification: 10/11/2023 to 1/3/2024.     Continue with PT plan of care with emphasis on shoulder AROM and PROM    Robinson Chester, PT, DPT

## 2023-11-08 ENCOUNTER — CLINICAL SUPPORT (OUTPATIENT)
Dept: REHABILITATION | Facility: OTHER | Age: 78
End: 2023-11-08
Payer: MEDICARE

## 2023-11-08 DIAGNOSIS — R29.898 DECREASED STRENGTH OF UPPER EXTREMITY: ICD-10-CM

## 2023-11-08 DIAGNOSIS — M25.611 DECREASED ROM OF RIGHT SHOULDER: Primary | ICD-10-CM

## 2023-11-08 PROCEDURE — 97110 THERAPEUTIC EXERCISES: CPT | Mod: PN

## 2023-11-08 PROCEDURE — 97530 THERAPEUTIC ACTIVITIES: CPT | Mod: PN

## 2023-11-08 PROCEDURE — 97112 NEUROMUSCULAR REEDUCATION: CPT | Mod: PN

## 2023-11-08 NOTE — PROGRESS NOTES
[facility]   Physical Therapy Treatment Note     Name: Conchis Ford  Clinic Number: [id number]    Therapy Diagnosis:   Encounter Diagnoses   Name Primary?    Decreased ROM of right shoulder Yes    Decreased strength of upper extremity        Physician: Henrry Che MD    Visit Date: 11/8/2023    Physician Orders: PT Eval and Treat   Medical Diagnosis from Referral:   M25.511,M25.512 (ICD-10-CM) - Bilateral shoulder pain   M54.6 (ICD-10-CM) - Left-sided thoracic back pain, unspecified chronicity      Evaluation Date: 10/11/2023  Authorization Period Expiration: 12/31/2023  Plan of Care Expiration: 1/3/2024  Progress Note Due: 11/11/2023  Visit # / Visits authorized: 7/ 20   FOTO: 0/ 3     Precautions: HTN      Time In: 9:57  Time Out: 10:57  Total Billable Time: 60 minutes      SUBJECTIVE     Pt reports: Her shoulder is feeling much better than is did last session    She was compliant with home exercise program.  Response to previous treatment: Decreased Pain  Functional change: Progressing    Pain: 1/10  Location: right shoulder      OBJECTIVE     Objective Measures updated at progress report unless specified.   Passive Range of Motion:   Shoulder Right Left   Flexion 160(11/8) 160   Abduction 90*    ER at 0 70 70   ER at 90 45* NU 80   IR 20* NU 35     Treatment       Conchis received the treatments listed below:      Therapeutic exercises to develop endurance, ROM, and flexibility for 9 minutes including:  Shoulder flexion with dowel 3x10  Dowel ER 2x15     Neuromuscular re-education activities to improve: Coordination, Kinesthetic, and Sense for 26 minutes. The following activities were included:  YTB ER/IR 3x12  Serratus press 3x10 4#  Standing hitchhikers 2x10 3s holds  TB shoulder extension 3x12 Blue TB  Supine Pertubation 3x30s  - 90 flexion  - 45 ER/IR  TB Row GTB 3x12  Side lying er 3x12 0#      Manual therapy techniques: Joint mobilizations were applied to the:  right shoulder for 4 minutes, including:  Posterior glide of humerus grade 3  GH oscillations    Skilled PROM of shoulder    Therapeutic activities to improve functional performance for 21 minutes, including:  Land Mine press 3x12 4#  Pulleys 3x10 - flexion  Cable pulley pull downs 3x10 7#    Patient Education and Home Exercises     Home Exercises Provided and Patient Education Provided     Education provided:   - HEP    Written Home Exercises Provided: Patient instructed to cont prior HEP. Exercises were reviewed and Conchis was able to demonstrate them prior to the end of the session.  Conchis demonstrated good  understanding of the education provided. See EMR under Patient Instructions for exercises provided during therapy sessions    ASSESSMENT     Conchis presented to PT with decrease symptoms compared to last session. She was progressed with serratus anterior strengthening exercises. She required verbal and tactile cues to perform exercises correctly. She is one point shy of FOTO score. Plan to progress as tolerated.       Conchis Is progressing well towards her goals.     Pt prognosis is Good.     Pt will continue to benefit from skilled outpatient physical therapy to address the deficits listed in the problem list box on initial evaluation, provide pt/family education and to maximize pt's level of independence in the home and community environment.     Pt's spiritual, cultural and educational needs considered and pt agreeable to plan of care and goals.     Anticipated Barriers for therapy: High irritability     Goals:  Short Term Goals:  6 weeks (Progressing, not met)  1.Report decreased right shoulder pain < / =  4/10  to increase tolerance for ADL's, dressing, showering  2. Increase PROM to 120 shoulder flexion    3. Increased strength by 1/3 MMT grade in right upper extremity to increase tolerance for ADL and work activities.  4. Pt to tolerate HEP to improve ROM and independence with ADL's     Long  Term Goals: 12 weeks (Progressing, not met)  1.Report decreased right shoulder pain  < / =  2 /10  to increase tolerance for ADL's, dressing, showering  2.Increase AROM to 140 shoulder flexion  3.Increase strength to >/= 4/5 in right upper extremity to increase tolerance for ADL and work activities.  4. Pt goal: Pt would like   5. Pt will have improved score of 61 on FOTO shoulder in order to demonstrate true functional improvement.      Plan      Plan of care Certification: 10/11/2023 to 1/3/2024.     Continue with PT plan of care with emphasis on shoulder AROM and PROM    Robinson Chester, PT, DPT

## 2023-11-10 ENCOUNTER — CLINICAL SUPPORT (OUTPATIENT)
Dept: REHABILITATION | Facility: OTHER | Age: 78
End: 2023-11-10
Payer: MEDICARE

## 2023-11-10 DIAGNOSIS — R29.898 DECREASED STRENGTH OF UPPER EXTREMITY: ICD-10-CM

## 2023-11-10 DIAGNOSIS — M25.611 DECREASED ROM OF RIGHT SHOULDER: Primary | ICD-10-CM

## 2023-11-10 PROCEDURE — 97110 THERAPEUTIC EXERCISES: CPT | Mod: PN

## 2023-11-10 PROCEDURE — 97112 NEUROMUSCULAR REEDUCATION: CPT | Mod: PN

## 2023-11-10 PROCEDURE — 97530 THERAPEUTIC ACTIVITIES: CPT | Mod: PN

## 2023-11-10 NOTE — PROGRESS NOTES
[facility]   Physical Therapy Treatment Note     Name: Conchis Ford  Clinic Number: [id number]    Therapy Diagnosis:   Encounter Diagnoses   Name Primary?    Decreased ROM of right shoulder Yes    Decreased strength of upper extremity        Physician: Henrry Che MD    Visit Date: 11/10/2023    Physician Orders: PT Eval and Treat   Medical Diagnosis from Referral:   M25.511,M25.512 (ICD-10-CM) - Bilateral shoulder pain   M54.6 (ICD-10-CM) - Left-sided thoracic back pain, unspecified chronicity      Evaluation Date: 10/11/2023  Authorization Period Expiration: 12/31/2023  Plan of Care Expiration: 1/3/2024  Progress Note Due: 11/11/2023  Visit # / Visits authorized: 8/ 20   FOTO: 0/ 3     Precautions: HTN      Time In: 10:00  Time Out: 10:59  Total Billable Time: 59 minutes      SUBJECTIVE     Pt reports: Her shoulder continue to feel better, but has to perform motions slowly  She was compliant with home exercise program.  Response to previous treatment: Decreased Pain  Functional change: Progressing    Pain: 1/10  Location: right shoulder      OBJECTIVE     Objective Measures updated at progress report unless specified.   Passive Range of Motion:   Shoulder Right Left   Flexion 160(11/8) 160   Abduction 90*    ER at 0 70 70   ER at 90 45* NU 80   IR 20* NU 35     Treatment       Conchis received the treatments listed below:      Therapeutic exercises to develop endurance, ROM, and flexibility for 8 minutes including:  Shoulder flexion with dowel 3x10  Dowel ER 2x15     Neuromuscular re-education activities to improve: Coordination, Kinesthetic, and Sense for 27 minutes. The following activities were included:  YTB ER/IR 3x12 with raise to 90  Serratus press 3x10 4#  Standing hitchhikers 2x10 3s holds  TB shoulder extension 3x12 Blue TB  Supine Pertubation 3x30s  - 90 flexion  - 45 ER/IR  TB Row GTB 3x12  Side lying er 3x12 1#  Wall slides with ball 2x10      Manual therapy  techniques: Joint mobilizations were applied to the: right shoulder for 4 minutes, including:  Posterior glide of humerus grade 3  GH oscillations    Skilled PROM of shoulder    Therapeutic activities to improve functional performance for 20 minutes, including:  Land Mine press 3x12 4#  Pulleys 3x10 - flexion  Cable pulley pull downs 3x10 7#    Patient Education and Home Exercises     Home Exercises Provided and Patient Education Provided     Education provided:   - HEP    Written Home Exercises Provided: Patient instructed to cont prior HEP. Exercises were reviewed and Conchis was able to demonstrate them prior to the end of the session.  Conchis demonstrated good  understanding of the education provided. See EMR under Patient Instructions for exercises provided during therapy sessions    ASSESSMENT     Conchis was progressed with periscapular strengthening weight that required verbal and tactile cues for upward rotation. She was challenged with shoulder raises with theraband as the exercises were very fatiguing, but no pain. Plan to progress as tolerated.       Conchis Is progressing well towards her goals.     Pt prognosis is Good.     Pt will continue to benefit from skilled outpatient physical therapy to address the deficits listed in the problem list box on initial evaluation, provide pt/family education and to maximize pt's level of independence in the home and community environment.     Pt's spiritual, cultural and educational needs considered and pt agreeable to plan of care and goals.     Anticipated Barriers for therapy: High irritability     Goals:  Short Term Goals:  6 weeks (Progressing, not met)  1.Report decreased right shoulder pain < / =  4/10  to increase tolerance for ADL's, dressing, showering  2. Increase PROM to 120 shoulder flexion    3. Increased strength by 1/3 MMT grade in right upper extremity to increase tolerance for ADL and work activities.  4. Pt to tolerate HEP to improve ROM and  independence with ADL's     Long Term Goals: 12 weeks (Progressing, not met)  1.Report decreased right shoulder pain  < / =  2 /10  to increase tolerance for ADL's, dressing, showering  2.Increase AROM to 140 shoulder flexion  3.Increase strength to >/= 4/5 in right upper extremity to increase tolerance for ADL and work activities.  4. Pt goal: Pt would like   5. Pt will have improved score of 61 on FOTO shoulder in order to demonstrate true functional improvement.      Plan      Plan of care Certification: 10/11/2023 to 1/3/2024.     Continue with PT plan of care with emphasis on shoulder AROM and PROM    Robinson Chester, PT, DPT

## 2023-11-15 ENCOUNTER — CLINICAL SUPPORT (OUTPATIENT)
Dept: REHABILITATION | Facility: OTHER | Age: 78
End: 2023-11-15
Payer: MEDICARE

## 2023-11-15 DIAGNOSIS — R29.898 DECREASED STRENGTH OF UPPER EXTREMITY: ICD-10-CM

## 2023-11-15 DIAGNOSIS — M25.611 DECREASED ROM OF RIGHT SHOULDER: Primary | ICD-10-CM

## 2023-11-15 PROCEDURE — 97530 THERAPEUTIC ACTIVITIES: CPT | Mod: PN

## 2023-11-15 PROCEDURE — 97112 NEUROMUSCULAR REEDUCATION: CPT | Mod: PN

## 2023-11-15 NOTE — PROGRESS NOTES
[facility]   Physical Therapy Treatment Note     Name: Conchis Ford  Clinic Number: [id number]    Therapy Diagnosis:   Encounter Diagnoses   Name Primary?    Decreased ROM of right shoulder Yes    Decreased strength of upper extremity          Physician: Henrry Che MD    Visit Date: 11/15/2023    Physician Orders: PT Eval and Treat   Medical Diagnosis from Referral:   M25.511,M25.512 (ICD-10-CM) - Bilateral shoulder pain   M54.6 (ICD-10-CM) - Left-sided thoracic back pain, unspecified chronicity      Evaluation Date: 10/11/2023  Authorization Period Expiration: 12/31/2023  Plan of Care Expiration: 1/3/2024  Progress Note Due: 11/11/2023  Visit # / Visits authorized: 9/ 20   FOTO: 0/ 3     Precautions: HTN      Time In: 9:50  Time Out: 10:50  Total Billable Time: 40 minutes      SUBJECTIVE     Pt reports: Her shoulder continue to feel better, but has to perform motions slowly  She was compliant with home exercise program.  Response to previous treatment: Decreased Pain  Functional change: Progressing    Pain: 1/10  Location: right shoulder      OBJECTIVE     Objective Measures updated at progress report unless specified.   Passive Range of Motion:   Shoulder Right Left   Flexion 160(11/8) 160   Abduction 90*    ER at 0 70 70   ER at 90 90 80   IR 20* NU 35     Treatment       Conchis received the treatments listed below:      Therapeutic exercises to develop endurance, ROM, and flexibility for 4 minutes including:  Shoulder flexion with dowel 3x10       Neuromuscular re-education activities to improve: Coordination, Kinesthetic, and Sense for 31 minutes. The following activities were included:  YTB ER/IR 3x12 with raise to 90  Serratus press 3x10 4#  Supine ER YTB 3x10  Standing hitchhikers 2x10 3s holds  TB shoulder extension 3x12 Blue TB  Supine Pertubation 3x30s  - 120 flexion  - 90 ER/IR  TB Row BTB 3x12  Wall slides with ball 2x10      Manual therapy techniques: Joint  mobilizations were applied to the: right shoulder for 4 minutes, including:  Posterior glide of humerus grade 3  GH oscillations    Skilled PROM of shoulder    Therapeutic activities to improve functional performance for 21 minutes, including:  Land Mine press 3x12 4#  Pulleys 3x10 - flexion  Cable pulley pull downs 3x10 7#    Patient Education and Home Exercises     Home Exercises Provided and Patient Education Provided     Education provided:   - HEP    Written Home Exercises Provided: Patient instructed to cont prior HEP. Exercises were reviewed and Conchis was able to demonstrate them prior to the end of the session.  Conchis demonstrated good  understanding of the education provided. See EMR under Patient Instructions for exercises provided during therapy sessions    ASSESSMENT     Conchis was progressed with cuff strengthening at 90/90. She required verbal and tactile cues but was able to achieve 90 degrees of external rotation. She continues to demonstrate good progress with improvements in function. Plan to progress as tolerated.         Conchis Is progressing well towards her goals.     Pt prognosis is Good.     Pt will continue to benefit from skilled outpatient physical therapy to address the deficits listed in the problem list box on initial evaluation, provide pt/family education and to maximize pt's level of independence in the home and community environment.     Pt's spiritual, cultural and educational needs considered and pt agreeable to plan of care and goals.     Anticipated Barriers for therapy: High irritability     Goals:  Short Term Goals:  6 weeks (Progressing, not met)  1.Report decreased right shoulder pain < / =  4/10  to increase tolerance for ADL's, dressing, showering  2. Increase PROM to 120 shoulder flexion    3. Increased strength by 1/3 MMT grade in right upper extremity to increase tolerance for ADL and work activities.  4. Pt to tolerate HEP to improve ROM and independence with  ADL's     Long Term Goals: 12 weeks (Progressing, not met)  1.Report decreased right shoulder pain  < / =  2 /10  to increase tolerance for ADL's, dressing, showering  2.Increase AROM to 140 shoulder flexion  3.Increase strength to >/= 4/5 in right upper extremity to increase tolerance for ADL and work activities.  4. Pt goal: Pt would like   5. Pt will have improved score of 61 on FOTO shoulder in order to demonstrate true functional improvement.      Plan      Plan of care Certification: 10/11/2023 to 1/3/2024.     Continue with PT plan of care with emphasis on shoulder AROM and PROM    Robinson Chester, PT, DPT

## 2023-11-17 ENCOUNTER — CLINICAL SUPPORT (OUTPATIENT)
Dept: REHABILITATION | Facility: OTHER | Age: 78
End: 2023-11-17
Payer: MEDICARE

## 2023-11-17 DIAGNOSIS — R29.898 DECREASED STRENGTH OF UPPER EXTREMITY: ICD-10-CM

## 2023-11-17 DIAGNOSIS — M25.611 DECREASED ROM OF RIGHT SHOULDER: Primary | ICD-10-CM

## 2023-11-17 PROCEDURE — 97530 THERAPEUTIC ACTIVITIES: CPT | Mod: PN

## 2023-11-17 PROCEDURE — 97112 NEUROMUSCULAR REEDUCATION: CPT | Mod: PN

## 2023-11-17 NOTE — PROGRESS NOTES
[facility]   Physical Therapy Treatment Note     Name: Conchis Ford  Clinic Number: [id number]    Therapy Diagnosis:   Encounter Diagnoses   Name Primary?    Decreased ROM of right shoulder Yes    Decreased strength of upper extremity          Physician: Henrry Che MD    Visit Date: 11/17/2023    Physician Orders: PT Eval and Treat   Medical Diagnosis from Referral:   M25.511,M25.512 (ICD-10-CM) - Bilateral shoulder pain   M54.6 (ICD-10-CM) - Left-sided thoracic back pain, unspecified chronicity      Evaluation Date: 10/11/2023  Authorization Period Expiration: 12/31/2023  Plan of Care Expiration: 1/3/2024  Progress Note Due: 11/11/2023  Visit # / Visits authorized: 10/ 20   FOTO: 0/ 3     Precautions: HTN      Time In: 10:00  Time Out: 10:59  Total Billable Time: 59 minutes      SUBJECTIVE     Pt reports: Her shoulder feels better and better. Still has pain if she is not careful with shoulder movements.  She was compliant with home exercise program.  Response to previous treatment: Decreased Pain  Functional change: Progressing    Pain: 1/10  Location: right shoulder      OBJECTIVE     Objective Measures updated at progress report unless specified.   Passive Range of Motion:   Shoulder Right Left   Flexion 160(11/8) 160   Abduction 90*    ER at 0 70 70   ER at 90 90 80   IR 20* NU 35     Treatment       Conchis received the treatments listed below:      Therapeutic exercises to develop endurance, ROM, and flexibility for 4 minutes including:  Shoulder flexion with dowel 3x10       Neuromuscular re-education activities to improve: Coordination, Kinesthetic, and Sense for 32 minutes. The following activities were included:  YTB ER/IR 3x12 with raise to 90  Serratus press 3x10 4#  Supine ER YTB 3x10  Standing hitchhikers 2x10 3s holds  TB shoulder extension 3x12 Blue TB  Supine Pertubation 3x30s  - 120 flexion  - 90 ER/IR  TB Row BTB 3x12  Wall slides with ball 2x10  Wall  Ball 2x15 circles each       Manual therapy techniques: Joint mobilizations were applied to the: right shoulder for 4 minutes, including:  Posterior glide of humerus grade 3  GH oscillations    Skilled PROM of shoulder    Therapeutic activities to improve functional performance for 19 minutes, including:  Land Mine press 3x12 4#  Pulleys 3x10 - flexion  Cable pulley pull downs 3x10 7#    Patient Education and Home Exercises     Home Exercises Provided and Patient Education Provided     Education provided:   - HEP    Written Home Exercises Provided: Patient instructed to cont prior HEP. Exercises were reviewed and Conchis was able to demonstrate them prior to the end of the session.  Conchis demonstrated good  understanding of the education provided. See EMR under Patient Instructions for exercises provided during therapy sessions    ASSESSMENT     Conchis was progressed with wall ball exercise and reported fatigue after the exercises. She continues to have difficulty reaching overhead when loaded. She required cues for scapular position and for glenohumeral spin during external and internal rotation exercises. Plan to progress as tolerated.       Conchis Is progressing well towards her goals.     Pt prognosis is Good.     Pt will continue to benefit from skilled outpatient physical therapy to address the deficits listed in the problem list box on initial evaluation, provide pt/family education and to maximize pt's level of independence in the home and community environment.     Pt's spiritual, cultural and educational needs considered and pt agreeable to plan of care and goals.     Anticipated Barriers for therapy: High irritability     Goals:  Short Term Goals:  6 weeks (Progressing, not met)  1.Report decreased right shoulder pain < / =  4/10  to increase tolerance for ADL's, dressing, showering  2. Increase PROM to 120 shoulder flexion    3. Increased strength by 1/3 MMT grade in right upper extremity to  increase tolerance for ADL and work activities.  4. Pt to tolerate HEP to improve ROM and independence with ADL's     Long Term Goals: 12 weeks (Progressing, not met)  1.Report decreased right shoulder pain  < / =  2 /10  to increase tolerance for ADL's, dressing, showering  2.Increase AROM to 140 shoulder flexion  3.Increase strength to >/= 4/5 in right upper extremity to increase tolerance for ADL and work activities.  4. Pt goal: Pt would like   5. Pt will have improved score of 61 on FOTO shoulder in order to demonstrate true functional improvement.      Plan      Plan of care Certification: 10/11/2023 to 1/3/2024.     Continue with PT plan of care with emphasis on shoulder AROM and PROM    Robinson Chester, PT, DPT

## 2023-11-22 ENCOUNTER — CLINICAL SUPPORT (OUTPATIENT)
Dept: REHABILITATION | Facility: OTHER | Age: 78
End: 2023-11-22
Payer: MEDICARE

## 2023-11-22 DIAGNOSIS — M25.611 DECREASED ROM OF RIGHT SHOULDER: Primary | ICD-10-CM

## 2023-11-22 DIAGNOSIS — R29.898 DECREASED STRENGTH OF UPPER EXTREMITY: ICD-10-CM

## 2023-11-22 PROCEDURE — 97530 THERAPEUTIC ACTIVITIES: CPT | Mod: PN

## 2023-11-22 PROCEDURE — 97112 NEUROMUSCULAR REEDUCATION: CPT | Mod: PN

## 2023-11-22 NOTE — PROGRESS NOTES
[facility]   Physical Therapy Treatment Note     Name: Conchis Ford  Clinic Number: [id number]    Therapy Diagnosis:   Encounter Diagnoses   Name Primary?    Decreased ROM of right shoulder Yes    Decreased strength of upper extremity        Physician: Henrry Che MD    Visit Date: 11/22/2023    Physician Orders: PT Eval and Treat   Medical Diagnosis from Referral:   M25.511,M25.512 (ICD-10-CM) - Bilateral shoulder pain   M54.6 (ICD-10-CM) - Left-sided thoracic back pain, unspecified chronicity      Evaluation Date: 10/11/2023  Authorization Period Expiration: 12/31/2023  Plan of Care Expiration: 1/3/2024  Progress Note Due: 11/11/2023  Visit # / Visits authorized: 11/ 20   FOTO: 0/ 3     Precautions: HTN      Time In: 10:00  Time Out: 11:00  Total Billable Time: 60 minutes      SUBJECTIVE     Pt reports: Her shoulder does occasionally throb, but better overall  She was compliant with home exercise program.  Response to previous treatment: Decreased Pain  Functional change: Progressing    Pain: 1/10  Location: right shoulder      OBJECTIVE     Objective Measures updated at progress report unless specified.   Passive Range of Motion:   Shoulder Right Left   Flexion 160(11/8) 160   Abduction 90*    ER at 0 70 70   ER at 90 90 80   IR 20* NU 35     Treatment       Conchis received the treatments listed below:      Therapeutic exercises to develop endurance, ROM, and flexibility for 0 minutes including:  Shoulder flexion with dowel 3x10       Neuromuscular re-education activities to improve: Coordination, Kinesthetic, and Sense for 32 minutes. The following activities were included:  YTB ER/IR 3x12 with raise to 90  Serratus press 3x10 4#  Supine ER YTB 3x10  TB shoulder extension 3x12 Blue TB  Supine Pertubation 3x30s  - 120 flexion  - 90 ER/IR  TB Row BTB 3x12  Wall slides with ball 2x10  Wall Ball 2x15 circles each   YTB T 2x10      Manual therapy techniques: Joint  mobilizations were applied to the: right shoulder for 4 minutes, including:  Posterior glide of humerus grade 3  GH oscillations    Skilled PROM of shoulder    Therapeutic activities to improve functional performance for 24 minutes, including:  Land Mine press 3x12 4#  Pulleys 3x10 - flexion  Cable pulley pull downs 3x10 7#  Dowel bend press 3x10 4#    Patient Education and Home Exercises     Home Exercises Provided and Patient Education Provided     Education provided:   - HEP    Written Home Exercises Provided: Patient instructed to cont prior HEP. Exercises were reviewed and Conchis was able to demonstrate them prior to the end of the session.  Conchis demonstrated good  understanding of the education provided. See EMR under Patient Instructions for exercises provided during therapy sessions    ASSESSMENT     Conchis was able to tolerated standing T with, but required verbal and tactile cues to perform the exercise with scapular retraction. She was able to achieve full shoulder range of motion during the session. She has the most difficulty with external rotation raises exercises. Plan to progress as tolerated.       Conchis Is progressing well towards her goals.     Pt prognosis is Good.     Pt will continue to benefit from skilled outpatient physical therapy to address the deficits listed in the problem list box on initial evaluation, provide pt/family education and to maximize pt's level of independence in the home and community environment.     Pt's spiritual, cultural and educational needs considered and pt agreeable to plan of care and goals.     Anticipated Barriers for therapy: High irritability     Goals:  Short Term Goals:  6 weeks (Progressing, not met)  1.Report decreased right shoulder pain < / =  4/10  to increase tolerance for ADL's, dressing, showering  2. Increase PROM to 120 shoulder flexion    3. Increased strength by 1/3 MMT grade in right upper extremity to increase tolerance for ADL and  work activities.  4. Pt to tolerate HEP to improve ROM and independence with ADL's     Long Term Goals: 12 weeks (Progressing, not met)  1.Report decreased right shoulder pain  < / =  2 /10  to increase tolerance for ADL's, dressing, showering  2.Increase AROM to 140 shoulder flexion  3.Increase strength to >/= 4/5 in right upper extremity to increase tolerance for ADL and work activities.  4. Pt goal: Pt would like   5. Pt will have improved score of 61 on FOTO shoulder in order to demonstrate true functional improvement.      Plan      Plan of care Certification: 10/11/2023 to 1/3/2024.     Continue with PT plan of care with emphasis on shoulder AROM and PROM    Robinson Chester, PT, DPT

## 2023-11-24 ENCOUNTER — CLINICAL SUPPORT (OUTPATIENT)
Dept: REHABILITATION | Facility: OTHER | Age: 78
End: 2023-11-24
Payer: MEDICARE

## 2023-11-24 DIAGNOSIS — M25.611 DECREASED ROM OF RIGHT SHOULDER: Primary | ICD-10-CM

## 2023-11-24 DIAGNOSIS — R29.898 DECREASED STRENGTH OF UPPER EXTREMITY: ICD-10-CM

## 2023-11-24 PROCEDURE — 97112 NEUROMUSCULAR REEDUCATION: CPT | Mod: PN

## 2023-11-24 NOTE — PROGRESS NOTES
[facility]   Physical Therapy Treatment Note     Name: Conchis Ford  Clinic Number: [id number]    Therapy Diagnosis:   Encounter Diagnoses   Name Primary?    Decreased ROM of right shoulder Yes    Decreased strength of upper extremity        Physician: Henrry Che MD    Visit Date: 11/24/2023    Physician Orders: PT Eval and Treat   Medical Diagnosis from Referral:   M25.511,M25.512 (ICD-10-CM) - Bilateral shoulder pain   M54.6 (ICD-10-CM) - Left-sided thoracic back pain, unspecified chronicity      Evaluation Date: 10/11/2023  Authorization Period Expiration: 12/31/2023  Plan of Care Expiration: 1/3/2024  Progress Note Due: 11/11/2023  Visit # / Visits authorized: 12/ 20   FOTO: 0/ 3     Precautions: HTN      Time In: 10:00 (late)  Time Out: 10:50  Total Billable Time: 20 minutes      SUBJECTIVE     Pt reports: She was able to change a light bulb this week  She was compliant with home exercise program.  Response to previous treatment: Decreased Pain  Functional change: Progressing    Pain: 0/10  Location: right shoulder      OBJECTIVE     Objective Measures updated at progress report unless specified.   Passive Range of Motion:   Shoulder Right Left   Flexion 160(11/8) 160   Abduction 90*    ER at 0 70 70   ER at 90 90 80   IR 20* NU 35     Treatment       Conchis received the treatments listed below:      Therapeutic exercises to develop endurance, ROM, and flexibility for 0 minutes including:  Shoulder flexion with dowel 3x10       Neuromuscular re-education activities to improve: Coordination, Kinesthetic, and Sense for 25 minutes. The following activities were included:  YTB ER/IR 3x12 with raise to 90  Serratus press 3x10 4#  Supine ER YTB 3x10  TB shoulder extension 3x12 Blue TB  Supine Pertubation 3x30s  - 120 flexion  - 90 ER/IR  TB Row BTB 3x12  Wall slides with ball 2x10  Wall Ball 2x15 circles each   YTB T 3x10      Manual therapy techniques: Joint  mobilizations were applied to the: right shoulder for 4 minutes, including:  Posterior glide of humerus grade 3  GH oscillations    Skilled PROM of shoulder    Therapeutic activities to improve functional performance for 21 minutes, including:  Land Mine press 3x12 4#  Pulleys 3x10 - flexion  Cable pulley pull downs 3x10 7#  Dowel bench press 3x10 4#    Patient Education and Home Exercises     Home Exercises Provided and Patient Education Provided     Education provided:   - HEP    Written Home Exercises Provided: Patient instructed to cont prior HEP. Exercises were reviewed and Conhcis was able to demonstrate them prior to the end of the session.  Conchis demonstrated good  understanding of the education provided. See EMR under Patient Instructions for exercises provided during therapy sessions    ASSESSMENT     Conchis reported being able to change a light bulb this week with no pain. She is progressing well with exercises and caution is being made to not progress her too quickly as she is making steady progress. Plan to progress her overhead strengthening as she has difficulty with putting plate up.         Conchis Is progressing well towards her goals.     Pt prognosis is Good.     Pt will continue to benefit from skilled outpatient physical therapy to address the deficits listed in the problem list box on initial evaluation, provide pt/family education and to maximize pt's level of independence in the home and community environment.     Pt's spiritual, cultural and educational needs considered and pt agreeable to plan of care and goals.     Anticipated Barriers for therapy: High irritability     Goals:  Short Term Goals:  6 weeks (Progressing, not met)  1.Report decreased right shoulder pain < / =  4/10  to increase tolerance for ADL's, dressing, showering  2. Increase PROM to 120 shoulder flexion    3. Increased strength by 1/3 MMT grade in right upper extremity to increase tolerance for ADL and work  activities.  4. Pt to tolerate HEP to improve ROM and independence with ADL's     Long Term Goals: 12 weeks (Progressing, not met)  1.Report decreased right shoulder pain  < / =  2 /10  to increase tolerance for ADL's, dressing, showering  2.Increase AROM to 140 shoulder flexion  3.Increase strength to >/= 4/5 in right upper extremity to increase tolerance for ADL and work activities.  4. Pt goal: Pt would like   5. Pt will have improved score of 61 on FOTO shoulder in order to demonstrate true functional improvement.      Plan      Plan of care Certification: 10/11/2023 to 1/3/2024.     Continue with PT plan of care with emphasis on shoulder AROM and PROM    Robinson Chester, PT, DPT

## 2023-11-27 ENCOUNTER — OFFICE VISIT (OUTPATIENT)
Dept: SLEEP MEDICINE | Facility: CLINIC | Age: 78
End: 2023-11-27
Payer: MEDICARE

## 2023-11-27 VITALS — BODY MASS INDEX: 45.58 KG/M2 | HEIGHT: 65 IN | WEIGHT: 273.56 LBS

## 2023-11-27 DIAGNOSIS — R09.82 POST-NASAL DRIP: ICD-10-CM

## 2023-11-27 DIAGNOSIS — G47.33 OSA (OBSTRUCTIVE SLEEP APNEA): Primary | ICD-10-CM

## 2023-11-27 DIAGNOSIS — J45.909 ASTHMA, UNSPECIFIED ASTHMA SEVERITY, UNSPECIFIED WHETHER COMPLICATED, UNSPECIFIED WHETHER PERSISTENT: ICD-10-CM

## 2023-11-27 PROCEDURE — 99999 PR PBB SHADOW E&M-EST. PATIENT-LVL IV: ICD-10-PCS | Mod: PBBFAC,,, | Performed by: INTERNAL MEDICINE

## 2023-11-27 PROCEDURE — 99214 OFFICE O/P EST MOD 30 MIN: CPT | Mod: PBBFAC | Performed by: INTERNAL MEDICINE

## 2023-11-27 PROCEDURE — 99214 OFFICE O/P EST MOD 30 MIN: CPT | Mod: S$PBB,,, | Performed by: INTERNAL MEDICINE

## 2023-11-27 PROCEDURE — 99999 PR PBB SHADOW E&M-EST. PATIENT-LVL IV: CPT | Mod: PBBFAC,,, | Performed by: INTERNAL MEDICINE

## 2023-11-27 PROCEDURE — 99214 PR OFFICE/OUTPT VISIT, EST, LEVL IV, 30-39 MIN: ICD-10-PCS | Mod: S$PBB,,, | Performed by: INTERNAL MEDICINE

## 2023-11-27 NOTE — PROGRESS NOTES
Subjective:       Patient ID: Conchis Ford is a 78 y.o. female.    Chief Complaint: RAMESH    HPI  Conchis Ford was diagnosed with Obstructive Sleep Apnea.    Patient presents today for follow up visit.  Doing well overall. Using CPAP regularly, finds benefit with use. No snoring.  Reports gaining a few lbs of weight.  She has also noted elevated AHI on some nights.      Initial triggers for sleep study include  snoring, daytime hypersomnia.    Baseline Sleep Study: 08/19/2017 Split night study 268 lb. The overall AHI was 11.8 with an oxygen izabella of 77.0%. Effective control of sleep disordered breathing was seen during supine REM stage sleep at a pressure of 14 cm of water.      Bedtime 10:30 PM- 7:30 AM.   Watches TV in bed. Sometimes falls asleep without mask on.  No snoring.  No xerostomia with CPAP.      Patient Durable Medical Equipment (DME) company is [employer] [phone] ([employer] [phone]      Device:    [employer] 2 Advanced  [id number]  7/28/2022 - 11/5/2022  Setting:  CPAP 14      The interface is the LeisureLink Full (Med) and it is comfortable.   Denies any metal implants.    According to the patient, the compliance with PAP is 4-6 hours per night, 6-7 nights per     PAP objective data downloaded from PAP device shows (CPT 11010):  Compliance:  Days used:  331/365  Days used > 4hrs:  310/365  Average use (hr):  5 hr 42 min  Pressure:   14  Residual AHI:   5.6    Conchis Ford does feel refreshed upon awakening.   An assessment of daytime sleep tendency reveals that she does not have episodes of inadvertent dozing even when inactive or being sedentary.        Naps are  taken.  Naps  are helpful in minimizing diurnal hypersomnia.          Objective:      Physical Exam  Constitutional:       Appearance: Normal appearance.   Neurological:      General: No focal deficit present.      Mental Status: She is alert and oriented to person, place, and time.   Psychiatric:         Mood and  Affect: Mood normal.         Behavior: Behavior normal.         Assessment:       Problem List Items Addressed This Visit          Other    RAMESH (obstructive sleep apnea) - Primary    Relevant Orders    CPAP/BIPAP SUPPLIES     Other Visit Diagnoses       Post-nasal drip        Asthma, unspecified asthma severity, unspecified whether complicated, unspecified whether persistent                  Plan:         Obstructive Sleep Apnea:     Patient is on PAP therapy at this time  Excellent Compliance  Excellent Efficacy    Slight elevation in AHI - will change from CPAP 14 cmH2O to A-PAP 14 - 18 cmH2O  - Teaching in regards to PAP use and mask adjustment was given to the patient during the visit today.  - Use PAP >4hours per night for 7 nights per week  - Use PAP for any daytime sleeping  - Interface patient chose that was prescribed today: FFM  -  Prescription for PAP device supplies will be send to the [employer] today. Filter, mask, tube with climate line and humidification system.      Patient suffers from a complex medical condition and if sleep disordered breathing is not properly treated it will increase her morbidity and mortality and patient is aware that has to be optimally compliant with therapy. Sleep disordered breathing has been associated with uncontrolled hypertension, insulin resistance, pulmonary hypertension, dementia, glaucoma, cardiac arrhythmias, cerebro vascular accident and multiple other conditions when not treated appropriately. Patient benefits from PAP therapy.      Asthma  Post Nasal Drip  - continue inhalers as prescribed  - flonase + azelastine 1 SENBID    Follow up 6 months

## 2023-11-29 ENCOUNTER — CLINICAL SUPPORT (OUTPATIENT)
Dept: REHABILITATION | Facility: OTHER | Age: 78
End: 2023-11-29
Payer: MEDICARE

## 2023-11-29 DIAGNOSIS — M25.611 DECREASED ROM OF RIGHT SHOULDER: Primary | ICD-10-CM

## 2023-11-29 DIAGNOSIS — R29.898 DECREASED STRENGTH OF UPPER EXTREMITY: ICD-10-CM

## 2023-11-29 PROCEDURE — 97112 NEUROMUSCULAR REEDUCATION: CPT | Mod: PN

## 2023-11-29 PROCEDURE — 97530 THERAPEUTIC ACTIVITIES: CPT | Mod: PN

## 2023-11-29 NOTE — PROGRESS NOTES
[facility]   Physical Therapy Treatment Note     Name: Conchis Ford  Clinic Number: [id number]    Therapy Diagnosis:   Encounter Diagnoses   Name Primary?    Decreased ROM of right shoulder Yes    Decreased strength of upper extremity          Physician: Henrry Che MD    Visit Date: 11/29/2023    Physician Orders: PT Eval and Treat   Medical Diagnosis from Referral:   M25.511,M25.512 (ICD-10-CM) - Bilateral shoulder pain   M54.6 (ICD-10-CM) - Left-sided thoracic back pain, unspecified chronicity      Evaluation Date: 10/11/2023  Authorization Period Expiration: 12/31/2023  Plan of Care Expiration: 1/3/2024  Progress Note Due: 11/11/2023  Visit # / Visits authorized: 13/ 20   FOTO: 0/ 3     Precautions: HTN      Time In: 9:52  Time Out: 10:52  Total Billable Time: 38 minutes      SUBJECTIVE     Pt reports:Her shoulder has been achy at rest but his shoulder is much more comfortable   She was compliant with home exercise program.  Response to previous treatment: Decreased Pain  Functional change: Progressing    Pain: 0/10  Location: right shoulder      OBJECTIVE     Objective Measures updated at progress report unless specified.   Passive Range of Motion:   Shoulder Right Left   Flexion 160(11/8) 160   Abduction 90*    ER at 0 70 70   ER at 90 90 80   IR 20* NU 35     Treatment       Conchis received the treatments listed below:      Therapeutic exercises to develop endurance, ROM, and flexibility for 0 minutes including:  Shoulder flexion with dowel 3x10       Neuromuscular re-education activities to improve: Coordination, Kinesthetic, and Sense for 35 minutes. The following activities were included:  YTB ER/IR 3x10 with raise to 90  Serratus press 3x10 5#  Supine ER YTB 3x10  TB shoulder extension 3x12 Blue TB  Supine Pertubation 3x30s  - 120 flexion  - 90 ER/IR  TB Row BTB 3x12  Wall slides with ball 2x10  Wall Ball 2x15 circles each   YTB T 3x10      Manual therapy  techniques: Joint mobilizations were applied to the: right shoulder for 0 minutes, including:  Posterior glide of humerus grade 3  GH oscillations    Skilled PROM of shoulder    Therapeutic activities to improve functional performance for 25 minutes, including:  Land Mine press 3x12 4#  Reaching above head 3x10 2#  Pulleys 3x10 - flexion  Cable pulley pull downs 3x10 7#  Dowel bench press 3x10 4#    Patient Education and Home Exercises     Home Exercises Provided and Patient Education Provided     Education provided:   - HEP    Written Home Exercises Provided: Patient instructed to cont prior HEP. Exercises were reviewed and Conchis was able to demonstrate them prior to the end of the session.  Conchis demonstrated good  understanding of the education provided. See EMR under Patient Instructions for exercises provided during therapy sessions    ASSESSMENT     Conchis was progressed with overhead reaching exercises and tolerated it well. She did report fatigue but not pain. She required verbal and tactile cues to perform exercises correctly without compensation and with scapular upward rotation. Plan to progress her overhead strengthening.      Conchis Is progressing well towards her goals.     Pt prognosis is Good.     Pt will continue to benefit from skilled outpatient physical therapy to address the deficits listed in the problem list box on initial evaluation, provide pt/family education and to maximize pt's level of independence in the home and community environment.     Pt's spiritual, cultural and educational needs considered and pt agreeable to plan of care and goals.     Anticipated Barriers for therapy: High irritability     Goals:  Short Term Goals:  6 weeks (Progressing, not met)  1.Report decreased right shoulder pain < / =  4/10  to increase tolerance for ADL's, dressing, showering  2. Increase PROM to 120 shoulder flexion    3. Increased strength by 1/3 MMT grade in right upper extremity to  increase tolerance for ADL and work activities.  4. Pt to tolerate HEP to improve ROM and independence with ADL's     Long Term Goals: 12 weeks (Progressing, not met)  1.Report decreased right shoulder pain  < / =  2 /10  to increase tolerance for ADL's, dressing, showering  2.Increase AROM to 140 shoulder flexion  3.Increase strength to >/= 4/5 in right upper extremity to increase tolerance for ADL and work activities.  4. Pt goal: Pt would like   5. Pt will have improved score of 61 on FOTO shoulder in order to demonstrate true functional improvement.      Plan      Plan of care Certification: 10/11/2023 to 1/3/2024.     Continue with PT plan of care with emphasis on shoulder AROM and PROM    Robinson Chester, PT, DPT

## 2023-12-01 ENCOUNTER — CLINICAL SUPPORT (OUTPATIENT)
Dept: REHABILITATION | Facility: OTHER | Age: 78
End: 2023-12-01
Payer: MEDICARE

## 2023-12-01 ENCOUNTER — OFFICE VISIT (OUTPATIENT)
Dept: PODIATRY | Facility: CLINIC | Age: 78
End: 2023-12-01
Payer: MEDICARE

## 2023-12-01 VITALS
SYSTOLIC BLOOD PRESSURE: 140 MMHG | BODY MASS INDEX: 45.58 KG/M2 | HEART RATE: 66 BPM | DIASTOLIC BLOOD PRESSURE: 63 MMHG | WEIGHT: 273.56 LBS | HEIGHT: 65 IN

## 2023-12-01 DIAGNOSIS — E11.42 TYPE 2 DIABETES MELLITUS WITH DIABETIC POLYNEUROPATHY, UNSPECIFIED WHETHER LONG TERM INSULIN USE: Primary | ICD-10-CM

## 2023-12-01 DIAGNOSIS — R29.898 DECREASED STRENGTH OF UPPER EXTREMITY: ICD-10-CM

## 2023-12-01 DIAGNOSIS — M20.42 HAMMER TOES OF BOTH FEET: ICD-10-CM

## 2023-12-01 DIAGNOSIS — M20.41 HAMMER TOES OF BOTH FEET: ICD-10-CM

## 2023-12-01 DIAGNOSIS — M25.611 DECREASED ROM OF RIGHT SHOULDER: Primary | ICD-10-CM

## 2023-12-01 DIAGNOSIS — M20.12 VALGUS DEFORMITY OF BOTH GREAT TOES: ICD-10-CM

## 2023-12-01 DIAGNOSIS — M20.11 VALGUS DEFORMITY OF BOTH GREAT TOES: ICD-10-CM

## 2023-12-01 PROCEDURE — 97530 THERAPEUTIC ACTIVITIES: CPT | Mod: PN

## 2023-12-01 PROCEDURE — 99214 OFFICE O/P EST MOD 30 MIN: CPT | Mod: PBBFAC,PN | Performed by: PODIATRIST

## 2023-12-01 PROCEDURE — 99213 PR OFFICE/OUTPT VISIT, EST, LEVL III, 20-29 MIN: ICD-10-PCS | Mod: S$PBB,,, | Performed by: PODIATRIST

## 2023-12-01 PROCEDURE — 99999 PR PBB SHADOW E&M-EST. PATIENT-LVL IV: ICD-10-PCS | Mod: PBBFAC,,, | Performed by: PODIATRIST

## 2023-12-01 PROCEDURE — 99999 PR PBB SHADOW E&M-EST. PATIENT-LVL IV: CPT | Mod: PBBFAC,,, | Performed by: PODIATRIST

## 2023-12-01 PROCEDURE — 97112 NEUROMUSCULAR REEDUCATION: CPT | Mod: PN

## 2023-12-01 PROCEDURE — 99213 OFFICE O/P EST LOW 20 MIN: CPT | Mod: S$PBB,,, | Performed by: PODIATRIST

## 2023-12-01 NOTE — PROGRESS NOTES
Subjective:      Patient ID: Conchis Ford is a 78 y.o. female.    Chief Complaint: Diabetic Foot Exam (PCP Henrry Che [place] [facility] [place]    Buildup of skin, odor, moisture between toes 4 5 right sometimes aching burning deep sharp pain in this area as well  Gradual onset, worsening over past several weeks, aggravated by increased weight bearing, shoe gear, pressure.  No previous medical treatment.  OTC pain med not helping.     Review of Systems   Constitutional: Negative for chills, diaphoresis, fever, malaise/fatigue and night sweats.   Cardiovascular:  Negative for claudication, cyanosis, leg swelling and syncope.   Skin:  Negative for color change, dry skin, itching, nail changes, rash, suspicious lesions and unusual hair distribution.   Musculoskeletal:  Negative for falls, joint pain, joint swelling, muscle cramps, muscle weakness and stiffness.   Gastrointestinal:  Negative for constipation, diarrhea, nausea and vomiting.   Neurological:  Positive for numbness and sensory change. Negative for brief paralysis, disturbances in coordination, focal weakness, paresthesias and tremors.           Objective:      Physical Exam  Constitutional:       General: She is not in acute distress.     Appearance: She is well-developed. She is not diaphoretic.   Cardiovascular:      Pulses:           Popliteal pulses are 2+ on the right side and 2+ on the left side.        Dorsalis pedis pulses are 2+ on the right side and 2+ on the left side.        Posterior tibial pulses are 2+ on the right side and 2+ on the left side.      Comments: Capillary refill 3 seconds all toes/distal feet, all toes/both feet warm to touch.      Negative lymphadenopathy bilateral popliteal fossa and tarsal tunnel.      Negavie lower extremity edema bilateral.    Musculoskeletal:      Right ankle: No swelling, deformity, ecchymosis or lacerations. Normal range of motion. Normal pulse.      Right Achilles Tendon: Normal. No defects.  Huang's test negative.      Comments: Adductovarus hammertoe of the 4th and 5th toes the right foot 5 more than 4 reducible today.      Hav bilateral without symptom.    Otherwise, Normal angle, base, station of gait. All ten toes without clubbing, cyanosis, or signs of ischemia.  No pain to palpation bilateral lower extremities.  Range of motion, stability, muscle strength, and muscle tone normal bilateral feet and legs.    Lymphadenopathy:      Lower Body: No right inguinal adenopathy. No left inguinal adenopathy.      Comments: Negative lymphadenopathy bilateral popliteal fossa and tarsal tunnel.    Negative lymphangitic streaking bilateral feet/ankles/legs.   Skin:     General: Skin is warm and dry.      Capillary Refill: Capillary refill takes 2 to 3 seconds.      Coloration: Skin is not pale.      Findings: No abrasion, bruising, burn, ecchymosis, erythema, laceration, lesion or rash.      Nails: There is no clubbing.      Comments: Skin is normal age and health appropriate color, turgor, texture, and temperature bilateral lower extremities without ulceration, hyperpigmentation, discoloration, masses nodules or cords palpated.  No ecchymosis, erythema, edema, or cardinal signs of infection bilateral lower extremities.    Nails normal color and trophic qualities.        Neurological:      Mental Status: She is alert and oriented to person, place, and time.      Sensory: Sensory deficit present.      Motor: No tremor, atrophy or abnormal muscle tone.      Gait: Gait normal.      Deep Tendon Reflexes:      Reflex Scores:       Patellar reflexes are 2+ on the right side and 2+ on the left side.       Achilles reflexes are 2+ on the right side and 2+ on the left side.     Comments: Decreased/absent vibratory sensation bilateral feet to 128Hz tuning fork.      Negative tinel sign to percussion sural, superficial peroneal, deep peroneal, saphenous, and posterior tibial nerves right and left ankles and feet.      Psychiatric:         Behavior: Behavior is cooperative.             Assessment:       Encounter Diagnoses   Name Primary?    Type 2 diabetes mellitus with diabetic polyneuropathy, unspecified whether long term insulin use Yes    Hammer toes of both feet     Valgus deformity of both great toes          Plan:       Conchis JENSEN was seen today for diabetic foot exam.    Diagnoses and all orders for this visit:    Type 2 diabetes mellitus with diabetic polyneuropathy, unspecified whether long term insulin use  -     DIABETIC SHOES FOR HOME USE  -      DIABETES FOOT EXAM    Hammer toes of both feet  -     DIABETIC SHOES FOR HOME USE  -     HM DIABETES FOOT EXAM    Valgus deformity of both great toes  -     DIABETIC SHOES FOR HOME USE  -      DIABETES FOOT EXAM      I counseled the patient on her conditions, their implications and medical management.       Discussed conservative treatment with shoes of adequate dimensions, material, and style to alleviate symptoms and delay or prevent surgical intervention.    Rx DM shoes, inserts.    Inspect feet multiple times daily for signs of occurrence/recurrence ulceration.            No follow-ups on file.

## 2023-12-01 NOTE — PROGRESS NOTES
[facility]   Physical Therapy Treatment Note     Name: Conchis Ford  Clinic Number: [id number]    Therapy Diagnosis:   Encounter Diagnoses   Name Primary?    Decreased ROM of right shoulder Yes    Decreased strength of upper extremity          Physician: Henrry Che MD    Visit Date: 12/1/2023    Physician Orders: PT Eval and Treat   Medical Diagnosis from Referral:   M25.511,M25.512 (ICD-10-CM) - Bilateral shoulder pain   M54.6 (ICD-10-CM) - Left-sided thoracic back pain, unspecified chronicity      Evaluation Date: 10/11/2023  Authorization Period Expiration: 12/31/2023  Plan of Care Expiration: 1/3/2024  Progress Note Due: 11/11/2023  Visit # / Visits authorized: 14/ 20   FOTO: 0/ 3     Precautions: HTN      Time In: 10:00  Time Out: 10:55  Total Billable Time: 30 minutes      SUBJECTIVE     Pt reports: She was able to wake up without pain in her shoulder  She was compliant with home exercise program.  Response to previous treatment: Decreased Pain  Functional change: Progressing    Pain: 0/10  Location: right shoulder      OBJECTIVE     Objective Measures updated at progress report unless specified.   Passive Range of Motion:   Shoulder Right Left   Flexion 160(11/8) 160   Abduction 90*    ER at 0 70 70   ER at 90 90 80   IR 20* NU 35     Treatment       Conchis received the treatments listed below:      Therapeutic exercises to develop endurance, ROM, and flexibility for 0 minutes including:  Shoulder flexion with dowel 3x10       Neuromuscular re-education activities to improve: Coordination, Kinesthetic, and Sense for 31 minutes. The following activities were included:  YTB ER/IR 3x10 with raise to 90  YTB ER/IR at 90/90 with adduction 2x10  Serratus press 3x10 6#  Supine ER YTB 3x12  TB shoulder extension 3x12 Blue TB  Supine Pertubation 3x30s  - 120 flexion  - 90 ER/IR  TB Row BTB 3x12  Wall slides with ball 2x10  Wall Ball 2x15 circles each   YTB T  3x10      Manual therapy techniques: Joint mobilizations were applied to the: right shoulder for 0 minutes, including:  Posterior glide of humerus grade 3  GH oscillations    Skilled PROM of shoulder    Therapeutic activities to improve functional performance for 24 minutes, including:  Land Mine press 3x12 5#  Reaching above head 3x10 2#  Pulleys 30x - flexion  Cable pulley pull downs 3x10 7#  Dowel bench press 3x10 6#    Patient Education and Home Exercises     Home Exercises Provided and Patient Education Provided     Education provided:   - HEP    Written Home Exercises Provided: Patient instructed to cont prior HEP. Exercises were reviewed and Conchis was able to demonstrate them prior to the end of the session.  Conchis demonstrated good  understanding of the education provided. See EMR under Patient Instructions for exercises provided during therapy sessions    ASSESSMENT     Conchis continues to be challenged with shoulder and periscapular exercises. She was able to tolerate overhead shoulder flexion exercises. She reported significant shoulder fatigue after the session, but no pain. She required cues for scapular upward rotation. Plan to progress her overhead strengthening and give FOTO.      Conchis Is progressing well towards her goals.     Pt prognosis is Good.     Pt will continue to benefit from skilled outpatient physical therapy to address the deficits listed in the problem list box on initial evaluation, provide pt/family education and to maximize pt's level of independence in the home and community environment.     Pt's spiritual, cultural and educational needs considered and pt agreeable to plan of care and goals.     Anticipated Barriers for therapy: High irritability     Goals:  Short Term Goals:  6 weeks (Progressing, not met)  1.Report decreased right shoulder pain < / =  4/10  to increase tolerance for ADL's, dressing, showering  2. Increase PROM to 120 shoulder flexion    3. Increased  strength by 1/3 MMT grade in right upper extremity to increase tolerance for ADL and work activities.  4. Pt to tolerate HEP to improve ROM and independence with ADL's     Long Term Goals: 12 weeks (Progressing, not met)  1.Report decreased right shoulder pain  < / =  2 /10  to increase tolerance for ADL's, dressing, showering  2.Increase AROM to 140 shoulder flexion  3.Increase strength to >/= 4/5 in right upper extremity to increase tolerance for ADL and work activities.  4. Pt goal: Pt would like   5. Pt will have improved score of 61 on FOTO shoulder in order to demonstrate true functional improvement.      Plan      Plan of care Certification: 10/11/2023 to 1/3/2024.     Continue with PT plan of care with emphasis on shoulder AROM and PROM    Robinson Chester, PT, DPT

## 2023-12-06 ENCOUNTER — CLINICAL SUPPORT (OUTPATIENT)
Dept: REHABILITATION | Facility: OTHER | Age: 78
End: 2023-12-06
Payer: MEDICARE

## 2023-12-06 DIAGNOSIS — R29.898 DECREASED STRENGTH OF UPPER EXTREMITY: ICD-10-CM

## 2023-12-06 DIAGNOSIS — M25.611 DECREASED ROM OF RIGHT SHOULDER: Primary | ICD-10-CM

## 2023-12-06 PROCEDURE — 97112 NEUROMUSCULAR REEDUCATION: CPT | Mod: PN

## 2023-12-06 PROCEDURE — 97530 THERAPEUTIC ACTIVITIES: CPT | Mod: PN

## 2023-12-06 NOTE — PROGRESS NOTES
[facility]   Physical Therapy Treatment Note     Name: Conchis Ford  Clinic Number: [id number]    Therapy Diagnosis:   Encounter Diagnoses   Name Primary?    Decreased ROM of right shoulder Yes    Decreased strength of upper extremity          Physician: Henrry Che MD    Visit Date: 12/6/2023    Physician Orders: PT Eval and Treat   Medical Diagnosis from Referral:   M25.511,M25.512 (ICD-10-CM) - Bilateral shoulder pain   M54.6 (ICD-10-CM) - Left-sided thoracic back pain, unspecified chronicity      Evaluation Date: 10/11/2023  Authorization Period Expiration: 12/31/2023  Plan of Care Expiration: 1/3/2024  Progress Note Due: 11/11/2023  Visit # / Visits authorized: 15/ 20   FOTO: 0/ 3     Precautions: HTN      Time In: 9:45  Time Out: 10:45  Total Billable Time: 55 minutes      SUBJECTIVE     Pt reports: She is feeling pretty good with no pain  She was compliant with home exercise program.  Response to previous treatment: Decreased Pain  Functional change: Progressing    Pain: 0/10  Location: right shoulder      OBJECTIVE     Objective Measures updated at progress report unless specified.   Passive Range of Motion:   Shoulder Right Left   Flexion 160(11/8) 160   Abduction 90*    ER at 0 70 70   ER at 90 90 80   IR 20* NU 35     Treatment       Conchis received the treatments listed below:      Therapeutic exercises to develop endurance, ROM, and flexibility for 0 minutes including:  Shoulder flexion with dowel 3x10       Neuromuscular re-education activities to improve: Coordination, Kinesthetic, and Sense for 30 minutes. The following activities were included:  YTB ER/IR 3x10 with raise to 90  YTB ER/IR at 90/90 with adduction 2x10  Serratus press 3x10 6#  Supine ER YTB 3x12  TB shoulder extension 3x12 Blue TB  Supine Pertubation 3x30s  - 120 flexion  - 90 ER/IR  TB Row BTB 3x12  Wall slides with ball 2x10  Wall Ball 2x15 circles each   YTB T 3x10      Manual therapy  techniques: Joint mobilizations were applied to the: right shoulder for 0 minutes, including:  Posterior glide of humerus grade 3  GH oscillations    Skilled PROM of shoulder    Therapeutic activities to improve functional performance for 25 minutes, including:  Land Mine press 3x12 5#  Reaching above head 3x10 3#  Pulleys 30x - flexion  Cable pulley pull downs 3x10 7#  Dowel bench press 3x10 6#    Patient Education and Home Exercises     Home Exercises Provided and Patient Education Provided     Education provided:   - HEP    Written Home Exercises Provided: Patient instructed to cont prior HEP. Exercises were reviewed and Conchis was able to demonstrate them prior to the end of the session.  Conchis demonstrated good  understanding of the education provided. See EMR under Patient Instructions for exercises provided during therapy sessions    ASSESSMENT     Conchis was challenged with exercises and tolerated it well without increase in symptoms. She was progressed with overhead strengthening with 3# with no pain. She was able to achieve FOTO goal. Plan to d/c pt next visit.     Conchis Is progressing well towards her goals.     Pt prognosis is Good.     Pt will continue to benefit from skilled outpatient physical therapy to address the deficits listed in the problem list box on initial evaluation, provide pt/family education and to maximize pt's level of independence in the home and community environment.     Pt's spiritual, cultural and educational needs considered and pt agreeable to plan of care and goals.     Anticipated Barriers for therapy: High irritability     Goals:  Short Term Goals:  6 weeks (Progressing, not met)  1.Report decreased right shoulder pain < / =  4/10  to increase tolerance for ADL's, dressing, showering  2. Increase PROM to 120 shoulder flexion    3. Increased strength by 1/3 MMT grade in right upper extremity to increase tolerance for ADL and work activities.  4. Pt to tolerate HEP to  improve ROM and independence with ADL's     Long Term Goals: 12 weeks (Progressing, not met)  1.Report decreased right shoulder pain  < / =  2 /10  to increase tolerance for ADL's, dressing, showering  2.Increase AROM to 140 shoulder flexion  3.Increase strength to >/= 4/5 in right upper extremity to increase tolerance for ADL and work activities.  4. Pt goal: Pt would like   5. Pt will have improved score of 61 on FOTO shoulder in order to demonstrate true functional improvement. (Met)     Plan      Plan of care Certification: 10/11/2023 to 1/3/2024.     Continue with PT plan of care with emphasis on shoulder AROM and PROM    Robinson Chester, PT, DPT

## 2023-12-08 ENCOUNTER — CLINICAL SUPPORT (OUTPATIENT)
Dept: REHABILITATION | Facility: OTHER | Age: 78
End: 2023-12-08
Payer: MEDICARE

## 2023-12-08 DIAGNOSIS — R29.898 DECREASED STRENGTH OF UPPER EXTREMITY: ICD-10-CM

## 2023-12-08 DIAGNOSIS — M25.611 DECREASED ROM OF RIGHT SHOULDER: Primary | ICD-10-CM

## 2023-12-08 PROCEDURE — 97530 THERAPEUTIC ACTIVITIES: CPT | Mod: PN

## 2023-12-08 PROCEDURE — 97112 NEUROMUSCULAR REEDUCATION: CPT | Mod: PN

## 2023-12-08 NOTE — PROGRESS NOTES
[facility]   Physical Therapy Treatment Note     Name: Conchis Ford  Clinic Number: [id number]    Therapy Diagnosis:   Encounter Diagnoses   Name Primary?    Decreased ROM of right shoulder Yes    Decreased strength of upper extremity        Physician: Henrry Che MD    Visit Date: 12/8/2023    Physician Orders: PT Eval and Treat   Medical Diagnosis from Referral:   M25.511,M25.512 (ICD-10-CM) - Bilateral shoulder pain   M54.6 (ICD-10-CM) - Left-sided thoracic back pain, unspecified chronicity      Evaluation Date: 10/11/2023  Authorization Period Expiration: 12/31/2023  Plan of Care Expiration: 1/3/2024  Progress Note Due: 11/11/2023  Visit # / Visits authorized: 16/ 20   FOTO: 0/ 3     Precautions: HTN      Time In: 10:01  Time Out: 10:59  Total Billable Time: 58 minutes      SUBJECTIVE     Pt reports: She has been doing well and that today will be her last day of PT  She was compliant with home exercise program.  Response to previous treatment: Decreased Pain  Functional change: Progressing    Pain: 0/10  Location: right shoulder      OBJECTIVE     Objective Measures updated at progress report unless specified.   Passive Range of Motion:   Shoulder Right Left   Flexion 160(11/8) 160   Abduction 90*    ER at 0 70 70   ER at 90 90 80   IR 20* NU 35     Treatment       Conchis received the treatments listed below:      Therapeutic exercises to develop endurance, ROM, and flexibility for 0 minutes including:  Shoulder flexion with dowel 3x10       Neuromuscular re-education activities to improve: Coordination, Kinesthetic, and Sense for 29 minutes. The following activities were included:  YTB ER/IR 3x10 with raise to 90  YTB ER/IR at 90/90 with adduction 2x10  Serratus press 3x10 6#  Supine ER YTB 3x12  TB shoulder extension 3x12 Blue TB  Supine Pertubation 3x30s  - 120 flexion  - 90 ER/IR  TB Row BTB 3x12  Wall slides with ball 2x10  Wall Ball 2x15 circles each   YTB T  3x10      Manual therapy techniques: Joint mobilizations were applied to the: right shoulder for 0 minutes, including:  Posterior glide of humerus grade 3  GH oscillations    Skilled PROM of shoulder    Therapeutic activities to improve functional performance for 29 minutes, including:  Land Mine press 3x12 5#  Reaching above head 3x10 5#  Pulleys 30x - flexion  Cable pulley pull downs 3x10 7#  Dowel bench press 3x10 6#    Patient Education and Home Exercises     Home Exercises Provided and Patient Education Provided     Education provided:   - HEP    Written Home Exercises Provided: Patient instructed to cont prior HEP. Exercises were reviewed and Conchis was able to demonstrate them prior to the end of the session.  Conchis demonstrated good  understanding of the education provided. See EMR under Patient Instructions for exercises provided during therapy sessions    ASSESSMENT     Conchis has progressed well through PT and will be discharged with HEP.  She was able to tolerated reaching overhead with 5# without any symptoms.     Conchis Is progressing well towards her goals.     Pt prognosis is Good.       Pt's spiritual, cultural and educational needs considered and pt agreeable to plan of care and goals.     Anticipated Barriers for therapy: High irritability     Goals:  Short Term Goals:  6 weeks (met)  1.Report decreased right shoulder pain < / =  4/10  to increase tolerance for ADL's, dressing, showering  2. Increase PROM to 120 shoulder flexion    3. Increased strength by 1/3 MMT grade in right upper extremity to increase tolerance for ADL and work activities.  4. Pt to tolerate HEP to improve ROM and independence with ADL's     Long Term Goals: 12 weeks (met)  1.Report decreased right shoulder pain  < / =  2 /10  to increase tolerance for ADL's, dressing, showering  2.Increase AROM to 140 shoulder flexion  3.Increase strength to >/= 4/5 in right upper extremity to increase tolerance for ADL and work  activities.  4. Pt goal: Pt would like   5. Pt will have improved score of 61 on FOTO shoulder in order to demonstrate true functional improvement. (Met)     Plan      Plan of care Certification: 10/11/2023 to 1/3/2024.     D/C    Robinson Chester, PT, DPT

## 2024-05-13 ENCOUNTER — OFFICE VISIT (OUTPATIENT)
Dept: SLEEP MEDICINE | Facility: CLINIC | Age: 79
End: 2024-05-13
Payer: MEDICARE

## 2024-05-13 VITALS
WEIGHT: 272.19 LBS | DIASTOLIC BLOOD PRESSURE: 78 MMHG | HEART RATE: 72 BPM | BODY MASS INDEX: 45.35 KG/M2 | HEIGHT: 65 IN | SYSTOLIC BLOOD PRESSURE: 127 MMHG

## 2024-05-13 DIAGNOSIS — G47.33 OSA (OBSTRUCTIVE SLEEP APNEA): Primary | ICD-10-CM

## 2024-05-13 PROCEDURE — 99999 PR PBB SHADOW E&M-EST. PATIENT-LVL IV: CPT | Mod: PBBFAC,,, | Performed by: INTERNAL MEDICINE

## 2024-05-13 PROCEDURE — 99214 OFFICE O/P EST MOD 30 MIN: CPT | Mod: PBBFAC | Performed by: INTERNAL MEDICINE

## 2024-05-13 PROCEDURE — 99214 OFFICE O/P EST MOD 30 MIN: CPT | Mod: S$PBB,,, | Performed by: INTERNAL MEDICINE

## 2024-05-13 NOTE — PROGRESS NOTES
Subjective:       Patient ID: Conchis Ford is a 78 y.o. female.    Chief Complaint: RAMESH    HPI  Conchis Ford was diagnosed with Obstructive Sleep Apnea. Known patient in sleep clinic. Seen previously by me in November 2023.  Patient presents today for follow up visit.   She reports seeing residual ahi on cpap for the past month. She has a hybrind full face mask. She has been using this for years. She did get new mask in the past month. She does notice some leaks. She also reports that her diurnal symptoms are worse in the past month. Prior to a month ago, she has been doing well with her cpap.    Baseline Sleep Study: 08/19/2017 Split night study 268 lb. The overall AHI was 11.8 with an oxygen izabella of 77.0%. Effective control of sleep disordered breathing was seen during supine REM stage sleep at a pressure of 14 cm of water.          Bedtime 10:30 PM- 7:30 AM.   Watches TV in bed. Sometimes falls asleep without mask on.  No snoring.  No xerostomia with CPAP.      Patient Durable Medical Equipment (DME) company is [employer] [phone] ([employer]). [phone]      Device:    VBOX 2 Advanced  [id number]  7/28/2022 - 11/5/2022  Setting:  CPAP 14      The interface is the [employer]ello (Med) and it is comfortable.   Denies any metal implants.    According to the patient, the compliance with PAP is 4-6 hours per night, 6-7 nights per     PAP objective data downloaded from PAP device shows (CPT 18917):  Compliance:  Days used:  342/365 30/30  Days used > 4hrs:  325/365 30/30  Average use (hr):  6 hr 6 min 6:58  Residual AHI:   5.8  16.5  Mask fit  92%  80%        Objective:      Physical Exam  Constitutional:       Appearance: Normal appearance.   Neurological:      General: No focal deficit present.      Mental Status: She is alert and oriented to person, place, and time.   Psychiatric:         Mood and Affect: Mood normal.         Behavior: Behavior normal.         Assessment:       Problem  List Items Addressed This Visit          Other    RAMESH (obstructive sleep apnea) - Primary           Plan:         Obstructive Sleep Apnea:     Today, she has complaint of high residual ahi on Dreamstation 2 device  - reviewed data and noted high leaks  - no mask or hose available for review. Patient instructed to fix mask straps and change mask  - will follow patient in 3-4 weeks to review data  - further testing dependent on data at that time  - continue A-PAP 14 - 18 cmH2O  - Teaching in regards to PAP use and mask adjustment was given to the patient during the visit today.  - Use PAP >4hours per night for 7 nights per week  - Use PAP for any daytime sleeping  - Interface patient chose that was prescribed today: FFM  -  Prescription for PAP device supplies will be send to the [facility] today. Filter, mask, tube with climate line and humidification system.      Patient suffers from a complex medical condition and if sleep disordered breathing is not properly treated it will increase her morbidity and mortality and patient is aware that has to be optimally compliant with therapy. Sleep disordered breathing has been associated with uncontrolled hypertension, insulin resistance, pulmonary hypertension, dementia, glaucoma, cardiac arrhythmias, cerebro vascular accident and multiple other conditions when not treated appropriately. Patient benefits from PAP therapy.      Asthma  Post Nasal Drip  - continue inhalers as prescribed  - flonase + azelastine 1 SENBID    Follow up June 2024

## 2024-06-03 ENCOUNTER — OFFICE VISIT (OUTPATIENT)
Dept: SLEEP MEDICINE | Facility: CLINIC | Age: 79
End: 2024-06-03
Payer: MEDICARE

## 2024-06-03 VITALS
SYSTOLIC BLOOD PRESSURE: 152 MMHG | BODY MASS INDEX: 44.81 KG/M2 | HEART RATE: 87 BPM | HEIGHT: 65 IN | WEIGHT: 268.94 LBS | DIASTOLIC BLOOD PRESSURE: 83 MMHG

## 2024-06-03 DIAGNOSIS — G47.33 OSA (OBSTRUCTIVE SLEEP APNEA): Primary | ICD-10-CM

## 2024-06-03 PROCEDURE — 99214 OFFICE O/P EST MOD 30 MIN: CPT | Mod: PBBFAC | Performed by: INTERNAL MEDICINE

## 2024-06-03 PROCEDURE — 99214 OFFICE O/P EST MOD 30 MIN: CPT | Mod: S$PBB,,, | Performed by: INTERNAL MEDICINE

## 2024-06-03 PROCEDURE — 99999 PR PBB SHADOW E&M-EST. PATIENT-LVL IV: CPT | Mod: PBBFAC,,, | Performed by: INTERNAL MEDICINE

## 2024-06-03 NOTE — PROGRESS NOTES
Subjective:       Patient ID: Conchis Ford is a 78 y.o. female.    Chief Complaint: RAMESH    HPI  Conchis Ford was diagnosed with Obstructive Sleep Apnea. Known patient in sleep clinic. Last seen May 2024. During that visit, she reports high leaks and residual ahi.    Today:  Device, hose and mask are present. Still with high residual leak in the past month. Data in the past 17 days, residual ahi 14.4, median leak of 56 l/min. 90% CPAP pressure 17.2. patient also experiencing poorer quality of sleep and increasing diurnal symptoms.      May 2024:  She reports seeing residual ahi on cpap for the past month. She has a hybrind full face mask. She has been using this for years. She did get new mask in the past month. She does notice some leaks. She also reports that her diurnal symptoms are worse in the past month. Prior to a month ago, she has been doing well with her cpap.    Baseline Sleep Study: 08/19/2017 Split night study 268 lb. The overall AHI was 11.8 with an oxygen izabella of 77.0%. Effective control of sleep disordered breathing was seen during supine REM stage sleep at a pressure of 14 cm of water.            Bedtime 10:30 PM- 7:30 AM.   Watches TV in bed. Sometimes falls asleep without mask on.  No snoring.  No xerostomia with CPAP.      Patient Durable Medical Equipment (DME) company is [employer] [phone] ([employer]). [phone]      Device:    DreamStation 2 Advanced  [id number]  7/28/2022 - 11/5/2022  Setting:  CPAP 14      The interface is the MYFLY Full (Med) and it is comfortable.   Denies any metal implants.    According to the patient, the compliance with PAP is 4-6 hours per night, 6-7 nights per     PAP objective data downloaded from PAP device shows (CPT 34015):  Compliance:  Days used:  342/365 30/30 17/17  Days used > 4hrs:  325/365 30/30 17/17  Average use (hr):  6 hr 6 min 6:58  6:46  Residual AHI:   5.8  16.5  14.4  Mask fit  92%  80%  80.6%        Objective:      Physical  Exam  Constitutional:       Appearance: Normal appearance.   Neurological:      General: No focal deficit present.      Mental Status: She is alert and oriented to person, place, and time.   Psychiatric:         Mood and Affect: Mood normal.         Behavior: Behavior normal.         Assessment:       Problem List Items Addressed This Visit          Other    RAMESH (obstructive sleep apnea) - Primary    Relevant Orders    BiPAP/CPAP Titration ((Must have dx of RAMESH from previous sleep study)             Plan:         Obstructive Sleep Apnea:   High residual leak, high leak on device. Changed mask and hose.  - removed Ramp  - pressure changed to 14 - 20  - in lab-titration ordered  - Teaching in regards to PAP use and mask adjustment was given to the patient during the visit today.  - Use PAP >4hours per night for 7 nights per week  - Use PAP for any daytime sleeping  - Interface patient chose that was prescribed today: FFM  -  Prescription for PAP device supplies will be send to the [employer] today. Filter, mask, tube with climate line and humidification system.      Patient suffers from a complex medical condition and if sleep disordered breathing is not properly treated it will increase her morbidity and mortality and patient is aware that has to be optimally compliant with therapy. Sleep disordered breathing has been associated with uncontrolled hypertension, insulin resistance, pulmonary hypertension, dementia, glaucoma, cardiac arrhythmias, cerebro vascular accident and multiple other conditions when not treated appropriately. Patient benefits from PAP therapy.        Follow up after titration study

## 2024-06-19 ENCOUNTER — PATIENT MESSAGE (OUTPATIENT)
Dept: SLEEP MEDICINE | Facility: OTHER | Age: 79
End: 2024-06-19
Payer: COMMERCIAL

## 2024-06-26 ENCOUNTER — HOSPITAL ENCOUNTER (OUTPATIENT)
Dept: SLEEP MEDICINE | Facility: OTHER | Age: 79
Discharge: HOME | End: 2024-06-26
Attending: INTERNAL MEDICINE
Payer: MEDICARE

## 2024-06-26 DIAGNOSIS — G47.33 OSA (OBSTRUCTIVE SLEEP APNEA): ICD-10-CM

## 2024-06-26 PROCEDURE — 95811 POLYSOM 6/>YRS CPAP 4/> PARM: CPT

## 2024-06-27 NOTE — PROGRESS NOTES
A titration sleep study was performed on [facility]. The following was explained to the pt prior to the study: the time to bed and wake time, the set-up process timeframe and the purpose of each sensor, the reason (if sensors fall off) the technician will need to enter the room during the night, the possibility of the tech fitting a PAP mask on pt for treatment in the middle of the night, and how to call out for assistance during the night. A post-study letter was handed to the pt in the morning.

## 2024-07-02 ENCOUNTER — PATIENT MESSAGE (OUTPATIENT)
Dept: SLEEP MEDICINE | Facility: CLINIC | Age: 79
End: 2024-07-02
Payer: COMMERCIAL

## 2024-07-02 DIAGNOSIS — G47.19 EXCESSIVE DAYTIME SLEEPINESS: ICD-10-CM

## 2024-07-02 DIAGNOSIS — G47.33 OSA (OBSTRUCTIVE SLEEP APNEA): Primary | ICD-10-CM

## 2024-07-22 NOTE — TELEPHONE ENCOUNTER
Patient's current CPAP not functioning correctly. Has high residual AHI despite recent titration study showing adequate therapy with similar pressures. It appears the device is not repairable. Will order new CPAP.

## 2024-08-15 ENCOUNTER — PATIENT MESSAGE (OUTPATIENT)
Dept: SLEEP MEDICINE | Facility: CLINIC | Age: 79
End: 2024-08-15
Payer: COMMERCIAL

## 2024-10-10 ENCOUNTER — OFFICE VISIT (OUTPATIENT)
Dept: SLEEP MEDICINE | Facility: CLINIC | Age: 79
End: 2024-10-10
Payer: MEDICARE

## 2024-10-10 DIAGNOSIS — G47.33 OBSTRUCTIVE SLEEP APNEA: Primary | ICD-10-CM

## 2024-10-10 NOTE — PROGRESS NOTES
The patient location is: [place]  The chief complaint leading to consultation is: trouble with sleep    Visit type: audiovisual    30 minutes of total time spent on the encounter, which includes face to face time and non-face to face time preparing to see the patient (eg, review of tests), Obtaining and/or reviewing separately obtained history, Documenting clinical information in the electronic or other health record, Independently interpreting results (not separately reported) and communicating results to the patient/family/caregiver, or Care coordination (not separately reported).     Each patient to whom he or she provides medical services by telemedicine is:  (1) informed of the relationship between the physician and patient and the respective role of any other health care provider with respect to management of the patient; and (2) notified that he or she may decline to receive medical services by telemedicine and may withdraw from such care at any time.    ESTABLISHED PATIENT VISIT    Conchis Ford  is a pleasant 78 y.o. female established with the [facility]    Here today for:  follow-up     Since last visit:   See assessment below      Past Medical History:   Diagnosis Date    Anemia     Arthritis     Asthma     Dizziness     Hay fever     Hyperlipidemia     Hypertension     Obesity      Patient Active Problem List   Diagnosis    Severe obesity (BMI >= 40)    Hypertension    Chest pain, non-cardiac    RAMESH (obstructive sleep apnea)    Decreased ROM of right shoulder    Decreased strength of upper extremity       Current Outpatient Medications:     acetaminophen (TYLENOL) 650 MG TbSR, Take 650 mg by mouth every 8 (eight) hours as needed., Disp: , Rfl:     amlodipine (NORVASC) 10 MG tablet, 10 mg once daily. , Disp: , Rfl: 2    ascorbic acid, vitamin C, (VITAMIN C) 1000 MG tablet, Take 1,000 mg by mouth once daily., Disp: , Rfl:     aspirin (ECOTRIN) 81 MG EC tablet, Take 81 mg by mouth once daily.,  Disp: , Rfl:     azelastine (ASTELIN) 137 mcg (0.1 %) nasal spray, 2 sprays 2 (two) times daily., Disp: , Rfl:     b complex vitamins capsule, Take 1 capsule by mouth once daily., Disp: , Rfl:     budesonide-formoterol 160-4.5 mcg (SYMBICORT) 160-4.5 mcg/actuation HFAA, Inhale 2 puffs into the lungs every 12 (twelve) hours. Controller, Disp: , Rfl:     BYSTOLIC 10 mg Tab, 10 mg once daily. , Disp: , Rfl: 1    cetirizine 10 mg chewable tablet, Take 10 mg by mouth., Disp: , Rfl:     ciclopirox 0.77 % Gel, Apply topically 2 (two) times daily., Disp: 100 g, Rfl: 3    cyanocobalamin (VITAMIN B-12) 1000 MCG tablet, Take 100 mcg by mouth once daily., Disp: , Rfl:     famotidine (PEPCID) 40 MG tablet, Take 40 mg by mouth once daily., Disp: , Rfl:     fluticasone (FLONASE) 50 mcg/actuation nasal spray, SPRAY ONCE  IN EACH NOSTRIL   BID, Disp: , Rfl: 3    levocetirizine (XYZAL) 5 MG tablet, Take 5 mg by mouth daily as needed., Disp: , Rfl:     LIDOcaine HCL 2% (XYLOCAINE) 2 % jelly, Apply topically as needed. Apply topically once nightly to affected part of foot/feet., Disp: 30 mL, Rfl: 2    lovastatin (MEVACOR) 20 MG tablet, TK 1 T PO QD, Disp: , Rfl: 2    metformin (GLUCOPHAGE) 1000 MG tablet, 1,000 mg 2 (two) times daily with meals. , Disp: , Rfl: 2    montelukast (SINGULAIR) 10 mg tablet, , Disp: , Rfl:     multivitamin capsule, Take 1 capsule by mouth once daily., Disp: , Rfl:     potassium chloride SA (K-DUR,KLOR-CON) 20 MEQ tablet, Take 20 mEq by mouth once daily. , Disp: , Rfl: 1    PROAIR HFA 90 mcg/actuation inhaler, INL 2 PFS PO Q 6 H PRF SOB OR WHZ, Disp: , Rfl: 2    TRULICITY 0.75 mg/0.5 mL pen injector, SMARTSI.5 Milliliter(s) SUB-Q Once a Week, Disp: , Rfl:     vitamin D 1000 units Tab, Take 5,000 Units by mouth once daily., Disp: , Rfl:      There were no vitals filed for this visit.  Physical Exam:    GEN:   Well-appearing  Psych:  Appropriate affect, demonstrates insight  SKIN:  No rash on the face or  "bridge of the nose      LABS:   No results found for: "HGB", "CO2"      RECORDS REVIEWED:        ASSESSMENT    ASSESSMENT    Sig PMH:  PROBLEM DESCRIPTION/ Sx on Presentation Interval Hx STATUS PLAN     RAMESH   Presentation:     Previously seen by , last visit 6.3.24. Pt had high residual leak, changed mask and hose. Removed ramp. Pressures changed to 14-20.  In-lab titration ordered.    Titration study 6.24.24 - effective at 12-14 cwp    PAP history   Dx Study PSG 8.19.17 - AHI 11.8, izabella 77%, split effective control at 14 cwp   Machine age 9.5.24 Resmed   Mask FFM   DME HME   My Air    CPAP age < 1 year   PAP altn    Benefits    PROBS Mask leak- seal is good before going to bed. Displaces overnight    Nose very itchy       SLEEP SCHEDULE   Duration    Wind- down    Envmnt    CBTi    Meds prior    Meds now    Bed Time 1030PM   Lights out    Latency 30-60min   Arousals 0-1   Back to sleep minutes   Stim. ctrl    Wake time 730AM   Caffeine    Naps occasional   Nocturia    Work                 Since last visit:     Here for compliance visit after receiving new machine.          Download       [email or web address]  10.10.24: 29/30days x 7h9m: 12-20 cwp (13.3/16.3/17.4), leak (29, 63.6, 89.1), AHI 7.1        largely controlled     PAP PLAN   E min 12 cwp   I max 20 cwp    PS/epr    RAMP    Other    Altn.            -Compliance usage met      -The patient is using and benefitting from PAP therapy when they have proper supplies when they have a functioning machine    -Still has high residual AHI and high mask leak    -consider changing to smaller mask size    -consider cpap pillow for side-sleepers    -will contact with update in 3-4 weeks     Daytime Sx     ESS 4/24 on intake      controlled   -continue treatment of RAMESH as above   Other issues:     RTC:   3-4 weeks          "

## 2024-11-11 ENCOUNTER — PATIENT MESSAGE (OUTPATIENT)
Dept: SLEEP MEDICINE | Facility: CLINIC | Age: 79
End: 2024-11-11
Payer: COMMERCIAL

## 2024-11-11 DIAGNOSIS — G47.33 OBSTRUCTIVE SLEEP APNEA: Primary | ICD-10-CM

## 2024-12-26 ENCOUNTER — OFFICE VISIT (OUTPATIENT)
Dept: PODIATRY | Facility: CLINIC | Age: 79
End: 2024-12-26
Payer: MEDICARE

## 2024-12-26 VITALS — BODY MASS INDEX: 44.81 KG/M2 | HEIGHT: 65 IN | WEIGHT: 268.94 LBS

## 2024-12-26 DIAGNOSIS — M79.674 TOE PAIN, RIGHT: ICD-10-CM

## 2024-12-26 DIAGNOSIS — M20.11 VALGUS DEFORMITY OF BOTH GREAT TOES: ICD-10-CM

## 2024-12-26 DIAGNOSIS — M20.41 HAMMER TOES OF BOTH FEET: ICD-10-CM

## 2024-12-26 DIAGNOSIS — M20.12 VALGUS DEFORMITY OF BOTH GREAT TOES: ICD-10-CM

## 2024-12-26 DIAGNOSIS — E11.42 TYPE 2 DIABETES MELLITUS WITH DIABETIC POLYNEUROPATHY, UNSPECIFIED WHETHER LONG TERM INSULIN USE: Primary | ICD-10-CM

## 2024-12-26 DIAGNOSIS — M20.42 HAMMER TOES OF BOTH FEET: ICD-10-CM

## 2024-12-26 DIAGNOSIS — L60.0 INGROWN NAIL: ICD-10-CM

## 2024-12-26 PROCEDURE — 99213 OFFICE O/P EST LOW 20 MIN: CPT | Mod: PBBFAC,PN | Performed by: PODIATRIST

## 2024-12-26 PROCEDURE — 99999 PR PBB SHADOW E&M-EST. PATIENT-LVL III: CPT | Mod: PBBFAC,,, | Performed by: PODIATRIST

## 2024-12-26 RX ORDER — TOBRAMYCIN 3 MG/ML
SOLUTION/ DROPS OPHTHALMIC
Qty: 5 ML | Refills: 3 | Status: SHIPPED | OUTPATIENT
Start: 2024-12-26

## 2024-12-26 NOTE — PROGRESS NOTES
Subjective:      Patient ID: Conchis Ford is a 79 y.o. female.    Chief Complaint: Diabetic Foot Exam (PCP Henrry Che [place]13[place] [facility] and Toe Pain (R great toe)    Sharp, throbbing pain right big toe/toenail.  Gradual onset, worsening over the past few days.  Aggravated with socks shoes pressure ambulation.  No prior medical treatment.  No self-treatment.  Denies trauma and surgery both feet.    Chief Complaint   Patient presents with    Diabetic Foot Exam     PCP Henrry Che 12[place]13/2024 [facility]    Toe Pain     R great toe     Diabetic shoes and inserts both feet she relates are too big    Review of Systems   Constitutional: Negative for chills, decreased appetite, diaphoresis, fever, malaise/fatigue and night sweats.   Skin:  Positive for nail changes.   Neurological:  Positive for paresthesias and sensory change.           Objective:      Physical Exam  Constitutional:       General: She is not in acute distress.     Appearance: She is well-developed. She is not diaphoretic.   Cardiovascular:      Pulses:           Popliteal pulses are 2+ on the right side and 2+ on the left side.        Dorsalis pedis pulses are 2+ on the right side and 2+ on the left side.        Posterior tibial pulses are 2+ on the right side and 2+ on the left side.      Comments: Capillary refill 3 seconds all toes/distal feet, all toes/both feet warm to touch.      Negative lymphadenopathy bilateral popliteal fossa and tarsal tunnel.      Negavie lower extremity edema bilateral.    Musculoskeletal:      Right ankle: No swelling, deformity, ecchymosis or lacerations. Normal range of motion. Normal pulse.      Right Achilles Tendon: Normal. No defects. Huang's test negative.   Lymphadenopathy:      Lower Body: No right inguinal adenopathy. No left inguinal adenopathy.      Comments: Negative lymphadenopathy bilateral popliteal fossa and tarsal tunnel.    Negative lymphangitic streaking bilateral feet/ankles/legs.   Skin:     " General: Skin is warm and dry.      Capillary Refill: Capillary refill takes 2 to 3 seconds.      Coloration: Skin is not pale.      Findings: No abrasion, bruising, burn, ecchymosis, erythema, laceration, lesion or rash.      Nails: There is no clubbing.      Comments:   Visible and palpable ingrowth of toenail medial border right hallux with pain to palpation, and focal localized erythema and edema,  without ulceration, drainage, pus, tracking, fluctuance, malodor, or cardinal signs infection.     Neurological:      Mental Status: She is alert and oriented to person, place, and time.      Sensory: No sensory deficit.      Motor: No tremor, atrophy or abnormal muscle tone.      Gait: Gait normal.      Comments: Decreased/absent vibratory sensation bilateral feet to 128Hz tuning fork.     Psychiatric:         Behavior: Behavior is cooperative.             Assessment:       Encounter Diagnoses   Name Primary?    Type 2 diabetes mellitus with diabetic polyneuropathy, unspecified whether long term insulin use Yes    Ingrown nail     Toe pain, right     Hammer toes of both feet     Valgus deformity of both great toes          Plan:       Conchis Roche" was seen today for diabetic foot exam and toe pain.    Diagnoses and all orders for this visit:    Type 2 diabetes mellitus with diabetic polyneuropathy, unspecified whether long term insulin use  -     DIABETIC SHOES FOR HOME USE    Ingrown nail  -     DIABETIC SHOES FOR HOME USE    Toe pain, right  -     DIABETIC SHOES FOR HOME USE    Hammer toes of both feet  -     DIABETIC SHOES FOR HOME USE    Valgus deformity of both great toes  -     DIABETIC SHOES FOR HOME USE    Other orders  -     tobramycin sulfate 0.3% (TOBREX) 0.3 % ophthalmic solution; 1-2 drops topically twice daily to affected toe(s).      I counseled the patient on her conditions, their implications and medical management.    Topical tobramycin drops twice daily right hallux.      Cover right " hallux all times with Band-Aid or similar changing daily.      Discussed conservative treatment with shoes of adequate dimensions, material, and style to alleviate symptoms and delay or prevent surgical intervention.      With the patient's permission, I debrided medial border right hallux toenail with a sterile nipper and curette, removing all offending nail and debris.  Patient tolerated the procedure well and related significant relief.      At patient request, new prescription diabetic shoes and heat molded inserts.      Brendon Mckoy PGY2 present for and full participant in all patient care this encounter.     Follow up if symptoms worsen or fail to improve.

## 2025-01-30 DIAGNOSIS — J30.2 PERENNIAL ALLERGIC RHINITIS WITH SEASONAL VARIATION: Primary | ICD-10-CM

## 2025-01-30 DIAGNOSIS — J45.40 MODERATE PERSISTENT ASTHMA WITHOUT COMPLICATION: ICD-10-CM

## 2025-01-30 DIAGNOSIS — J30.89 PERENNIAL ALLERGIC RHINITIS WITH SEASONAL VARIATION: Primary | ICD-10-CM

## 2025-02-06 ENCOUNTER — LAB VISIT (OUTPATIENT)
Dept: LAB | Facility: HOSPITAL | Age: 80
End: 2025-02-06
Payer: MEDICARE

## 2025-02-06 ENCOUNTER — OFFICE VISIT (OUTPATIENT)
Dept: ALLERGY | Facility: CLINIC | Age: 80
End: 2025-02-06
Payer: MEDICARE

## 2025-02-06 VITALS — BODY MASS INDEX: 44.33 KG/M2 | HEIGHT: 64 IN | WEIGHT: 259.69 LBS

## 2025-02-06 DIAGNOSIS — J30.2 PERENNIAL ALLERGIC RHINITIS WITH SEASONAL VARIATION: ICD-10-CM

## 2025-02-06 DIAGNOSIS — Z88.0 PENICILLIN ALLERGY: ICD-10-CM

## 2025-02-06 DIAGNOSIS — J45.40 MODERATE PERSISTENT ASTHMA WITHOUT COMPLICATION: ICD-10-CM

## 2025-02-06 DIAGNOSIS — J31.0 CHRONIC RHINITIS: ICD-10-CM

## 2025-02-06 DIAGNOSIS — J31.0 CHRONIC RHINITIS: Primary | ICD-10-CM

## 2025-02-06 DIAGNOSIS — J30.89 PERENNIAL ALLERGIC RHINITIS WITH SEASONAL VARIATION: ICD-10-CM

## 2025-02-06 LAB — IGE SERPL-ACNC: 76 IU/ML (ref 0–100)

## 2025-02-06 PROCEDURE — 99204 OFFICE O/P NEW MOD 45 MIN: CPT | Mod: S$PBB,,, | Performed by: STUDENT IN AN ORGANIZED HEALTH CARE EDUCATION/TRAINING PROGRAM

## 2025-02-06 PROCEDURE — 86003 ALLG SPEC IGE CRUDE XTRC EA: CPT | Mod: 59 | Performed by: STUDENT IN AN ORGANIZED HEALTH CARE EDUCATION/TRAINING PROGRAM

## 2025-02-06 PROCEDURE — 86003 ALLG SPEC IGE CRUDE XTRC EA: CPT | Performed by: STUDENT IN AN ORGANIZED HEALTH CARE EDUCATION/TRAINING PROGRAM

## 2025-02-06 PROCEDURE — 99999 PR PBB SHADOW E&M-EST. PATIENT-LVL IV: CPT | Mod: PBBFAC,,, | Performed by: STUDENT IN AN ORGANIZED HEALTH CARE EDUCATION/TRAINING PROGRAM

## 2025-02-06 PROCEDURE — 82785 ASSAY OF IGE: CPT | Performed by: STUDENT IN AN ORGANIZED HEALTH CARE EDUCATION/TRAINING PROGRAM

## 2025-02-06 PROCEDURE — 99214 OFFICE O/P EST MOD 30 MIN: CPT | Mod: PBBFAC | Performed by: STUDENT IN AN ORGANIZED HEALTH CARE EDUCATION/TRAINING PROGRAM

## 2025-02-06 RX ORDER — METFORMIN HYDROCHLORIDE 1000 MG/1
TABLET ORAL
COMMUNITY
Start: 2022-11-02 | End: 2025-02-06

## 2025-02-06 RX ORDER — NEBIVOLOL 20 MG/1
TABLET ORAL
COMMUNITY
Start: 2024-12-20

## 2025-02-06 RX ORDER — CHOLECALCIFEROL (VITAMIN D3) 125 MCG
CAPSULE ORAL
COMMUNITY

## 2025-02-06 RX ORDER — CHLORTHALIDONE 25 MG/1
TABLET ORAL
COMMUNITY
Start: 2022-11-02 | End: 2025-02-06

## 2025-02-06 RX ORDER — ALBUTEROL SULFATE 90 UG/1
2 INHALANT RESPIRATORY (INHALATION) EVERY 6 HOURS PRN
COMMUNITY
Start: 2025-01-09

## 2025-02-06 RX ORDER — LOVASTATIN 40 MG/1
40 TABLET ORAL NIGHTLY
COMMUNITY

## 2025-02-06 RX ORDER — POTASSIUM CHLORIDE 20 MEQ/1
1 TABLET, EXTENDED RELEASE ORAL DAILY
COMMUNITY
Start: 2024-07-03

## 2025-02-06 RX ORDER — CLOBETASOL PROPIONATE 0.5 MG/ML
SOLUTION TOPICAL
COMMUNITY
Start: 2024-05-11

## 2025-02-06 RX ORDER — EVOLOCUMAB 140 MG/ML
INJECTION, SOLUTION SUBCUTANEOUS
COMMUNITY

## 2025-02-06 RX ORDER — UBIDECARENONE 30 MG
30 CAPSULE ORAL 3 TIMES DAILY
COMMUNITY

## 2025-02-06 RX ORDER — LEVOCETIRIZINE DIHYDROCHLORIDE 5 MG/1
TABLET, FILM COATED ORAL
COMMUNITY
Start: 2022-11-04

## 2025-02-06 RX ORDER — FAMOTIDINE 40 MG/1
TABLET, FILM COATED ORAL
COMMUNITY
Start: 2022-11-02

## 2025-02-06 RX ORDER — MONTELUKAST SODIUM 10 MG/1
1 TABLET ORAL NIGHTLY
COMMUNITY
Start: 2024-12-13

## 2025-02-06 RX ORDER — KETOCONAZOLE 20 MG/G
CREAM TOPICAL 2 TIMES DAILY
COMMUNITY
Start: 2024-10-27

## 2025-02-06 RX ORDER — AZELASTINE 1 MG/ML
2 SPRAY, METERED NASAL 2 TIMES DAILY
COMMUNITY
Start: 2024-12-13

## 2025-02-06 RX ORDER — FLUTICASONE PROPIONATE 50 MCG
2 SPRAY, SUSPENSION (ML) NASAL
COMMUNITY
Start: 2024-12-13 | End: 2025-02-06 | Stop reason: SDUPTHER

## 2025-02-06 RX ORDER — POTASSIUM CHLORIDE 1500 MG/1
TABLET, EXTENDED RELEASE ORAL
COMMUNITY
Start: 2022-11-02

## 2025-02-06 RX ORDER — NEBIVOLOL 10 MG/1
TABLET ORAL
COMMUNITY
Start: 2022-11-02 | End: 2025-02-06

## 2025-02-06 RX ORDER — CALCIUM CARBONATE 300MG(750)
400 TABLET,CHEWABLE ORAL
COMMUNITY

## 2025-02-06 RX ORDER — TIRZEPATIDE 7.5 MG/.5ML
7.5 INJECTION, SOLUTION SUBCUTANEOUS
COMMUNITY

## 2025-02-06 RX ORDER — AMLODIPINE BESYLATE 5 MG/1
TABLET ORAL
COMMUNITY
Start: 2024-12-20

## 2025-02-06 RX ORDER — AMLODIPINE BESYLATE 10 MG/1
5 TABLET ORAL
COMMUNITY
Start: 2022-11-02 | End: 2025-02-06 | Stop reason: ALTCHOICE

## 2025-02-06 RX ORDER — SACUBITRIL AND VALSARTAN 49; 51 MG/1; MG/1
1 TABLET, FILM COATED ORAL 2 TIMES DAILY
COMMUNITY

## 2025-02-06 NOTE — PATIENT INSTRUCTIONS
Dust mites are tiny microscopic creatures that are in the house dust of everyone's homes. Avoiding them can be tricky because they cannot be eliminated with extermination. Dust mites live with us, and do not bother us. They like places where they can find and eat the skin cells (our human dander) that we have shed. They are mostly found in our beds, within our mattresses, and within our pillows, as well as in the pile of carpets, and in upholstered furniture. Since most people send 1/3 of their time in their beds, targeting the bedroom for removal and avoidance of exposure to dust mites is key.    To control exposure to dust mites:  Encase all mattresses, pillows, and box springs with 'dust mite proof' encasement. They must be completely encased to be effective. Encasements are available from various manufacturers. They MUST say they are for 'dust mite' proofing.  Wash bed linens on the hot cycle (140 degrees F) once per week. It is recommended that only washable linens be used such as layers of blankets, and NOT thick comforters.  Remove all stuffed toys from the bed and bedroom. Store them between play in a sealed plastic bin.  Do your vacuuming and dusting BEFORE changing the bed linens.  Vacuum the bedroom and other living spaces at least once per week. If you are allergic, have someone else vacuum, or wear a 3M mask while vacuuming. Do not have the allergic child in the room while vacuuming.  Wait at least 20 minutes after vacuuming, dusting, or sweeping. After vacuuming, dust the room with a damp cloth.  Replace home air filters once per month.    Long-term goals for reducing dust mites:  Limit the number of curtains, drapes, plants, and other items that collect dust and are difficult to clean with wiping.  Reduce humidity to 40-50% with air conditioner or de-humidifier. Do not use a humidifier on a regular basis.  Remove carpet from the bedroom. Replace carpeting in other living areas with smaller rugs that  can be laundered or shaken outdoors. The best floor surfaces for cleaning are wood, vinyl, tile, or linoleum.

## 2025-02-06 NOTE — PROGRESS NOTES
"[facility]       HISTORY OF PRESENT ILLNESS   Referral from: Dr. Henrry Che  CC: Chronic rhinitis and persistent asthma     HPI: Conchis Ford is a 79 y.o. female  History obtained from patient.     Chronic rhinitis: congestion, itching of nose/eye and throat, PND with sore throat. Rarely sneezing. On CPAP for sleep apnea.   Prior testing: CD and DM over 30 years done in [place].In the past had septum deviation. Worse on Left side. Some hyposmia.   Meds Xyzal and Singulair, Flonase and azelastine qd most time sometimes   Environment: sleep with cpap with itching (not rash around the mask), loves cats but doesn't have one. She has plastic covers in her bed. She removed carpet.   ENT: septoplasty (before [facility]).     Asthma: at 39 yo first triggered. For 4 years improved with the septoplasty. Currently not getting many  episodes of asthma that includes wheezing. Last one was last month triggered by fragrance. CHEYENNE worked.   Triggered: strong smells. Not triggered by viral URI.   Meds: Rarely needs albuterol. Not on controller. In the past Symbicort but not needing it.   ER: In the past every month       Penicillin Allergy: 39 yo sore throat from sinus got penicillin. She had been taking it for a week. Complete full course and broke out in hives. Presented to ER for hives. Atarax was rx. Several days of hives when they would come back she would have shortness of breath. Presented to the ER for asthmas and that's when she was diagnosed. No penicillin since then     Chart review: no CT imaging or PFT available to review. No CBC w diff     Mother: severe asthma      MEDICAL HISTORY   SurgHx:  Past Surgical History:   Procedure Laterality Date    HYSTERECTOMY      KNEE SURGERY      septiplasty  09/91        PHYSICAL EXAM   VS: Ht 5' 3.82" (1.621 m)   Wt 117.8 kg (259 lb 11.2 oz)   BMI 44.83 kg/m²   GENERAL: NAD, well nourished, well appearing  EYES: no conjunctival injection, no discharge, " no infraorbital shiners  NOSE: pale and swollen inf turbinates B/L, no polyps  LUNGS: CTAB, no w/r/c, no increased WOB     LABS AND IMAGING     None      ASSESSMENT & PLAN     Chronic rhinitis: symptoms include congestion, rhinorrhea, itching of eyes, nose.throat and PND for several year. She had testing over 30 years ago with sensitization to DM and CD. No cats. She also septoplasty in the past w ENT and no further procedures.    -Aeroallergen testing to indoor and outdoor via immunocaps   -Increase fluticasone to BID consistently ok to keep and Azelastine daily if burning too much. Reviewed technique   -If mono sensitized to DM can consider Odactra      Mild intermittent asthma: not on any controllers, rarely needs CHEYENNE or SCS. Triggered mostly by strong smells.     -Continue Albuterol inhaler with spacer every 4hours PRN SOB or wheezing or cough  -If worsening or need of more CHEYENNE will get PFT with bronchodilator     Penicillin allergy: hives for a few days with intermittent wheezing after completing a week of penicillin.This was over 30 years and she has avoided since tehn. Her sxs occurring after completing medication makes and IgE mediated reaction very unlikely. We have offered to dos 2 step challenge vs spt/id vs continue to avoid.     -Patient agrees to come back fro two step oral challenge      Follow up: fro 2 step drug oral challenge and discuss further labs     I spent a total of 45 minutes on the day of the visit. This includes face to face time and non-face to face time preparing to see the patient (eg, review of tests), obtaining and/or reviewing separately obtained history, documenting clinical information in the electronic or other health record, independently interpreting results and communicating results to the patient/family/caregiver, or care coordinator.        Russell Forde MD   [facility]

## 2025-02-10 LAB
A ALTERNATA IGE QN: <0.1 KU/L
A FUMIGATUS IGE QN: <0.1 KU/L
BERMUDA GRASS IGE QN: <0.1 KU/L
CAT DANDER IGE QN: <0.1 KU/L
CEDAR IGE QN: <0.1 KU/L
D FARINAE IGE QN: 1.62 KU/L
D PTERONYSS IGE QN: 0.92 KU/L
DEPRECATED CEDAR IGE RAST QL: NORMAL
DEPRECATED TIMOTHY IGE RAST QL: NORMAL
DOG DANDER IGE QN: <0.1 KU/L
ELDER IGE QN: <0.1 KU/L
ENGL PLANTAIN IGE QN: <0.1 KU/L
PECAN/HICK TREE IGE QN: <0.1 KU/L
RAST CLASS: ABNORMAL
RAST CLASS: ABNORMAL
RAST CLASS: NORMAL
TIMOTHY IGE QN: <0.1 KU/L
WEST RAGWEED IGE QN: <0.1 KU/L
WHITE OAK IGE QN: <0.1 KU/L

## 2025-02-13 ENCOUNTER — OFFICE VISIT (OUTPATIENT)
Dept: ALLERGY | Facility: CLINIC | Age: 80
End: 2025-02-13
Payer: MEDICARE

## 2025-02-13 VITALS — BODY MASS INDEX: 44.04 KG/M2 | HEIGHT: 64 IN | WEIGHT: 257.94 LBS

## 2025-02-13 DIAGNOSIS — T78.2XXA ANAPHYLAXIS, INITIAL ENCOUNTER: Primary | ICD-10-CM

## 2025-02-13 PROCEDURE — 99214 OFFICE O/P EST MOD 30 MIN: CPT | Mod: PBBFAC | Performed by: STUDENT IN AN ORGANIZED HEALTH CARE EDUCATION/TRAINING PROGRAM

## 2025-02-13 PROCEDURE — 99215 OFFICE O/P EST HI 40 MIN: CPT | Mod: S$PBB,,, | Performed by: STUDENT IN AN ORGANIZED HEALTH CARE EDUCATION/TRAINING PROGRAM

## 2025-02-13 PROCEDURE — 99999 PR PBB SHADOW E&M-EST. PATIENT-LVL IV: CPT | Mod: PBBFAC,,, | Performed by: STUDENT IN AN ORGANIZED HEALTH CARE EDUCATION/TRAINING PROGRAM

## 2025-02-13 RX ORDER — EPINEPHRINE 1 MG/ML
0.5 INJECTION, SOLUTION, CONCENTRATE INTRAVENOUS
Status: SHIPPED | OUTPATIENT
Start: 2025-02-13

## 2025-02-13 RX ORDER — ALBUTEROL SULFATE 0.83 MG/ML
2.5 SOLUTION RESPIRATORY (INHALATION)
Status: SHIPPED | OUTPATIENT
Start: 2025-02-13

## 2025-02-13 NOTE — PROGRESS NOTES
"[facility]       HISTORY OF PRESENT ILLNESS   Referral from: No ref. provider found  CC: Penicllin challenge        HPI: Conchis Ford is a 79 y.o. female  History obtained from patient.     Presents today for oral challenge to penicillin. States she feels well but does endorse a scratchy throat since waking up which she thinks is related to her cpap. No other sxs.      MEDICAL HISTORY   SurgHx:  Past Surgical History:   Procedure Laterality Date    HYSTERECTOMY      KNEE SURGERY      septiplasty  09/01/1991    SINUS SURGERY          PHYSICAL EXAM   VS: Ht 5' 4" (1.626 m)   Wt 117 kg (257 lb 15 oz)   BMI 44.27 kg/m²   GENERAL: NAD, well nourished, well appearing  EYES: no conjunctival injection, no discharge, no infraorbital shiners  MOUTH: no swelling some mild erythema  NOSE: pale and swollen inf turbinates B/L, no polyps  LUNGS: CTAB, no w/r/c, no increased WOB  DERM: no hives.      Penicillin Challenge         Patient presents for a 2 step Amoxicillin Provocation test. Prior to challenge, on exam patient was in NAD, had normal RR with no wheezing or rales and no rashes or hives. Patient took 42 mg of Amoxicillin and within 15 minutes she developed a dry cough. At that time now wheezing noted. Her VS were normal including BP and saturations of 98%. She took some water with no improvement. Used her albuterol pump but improvement noted. Exam with no wheezing and good air movement. At 1105 was c/o chest tightness she was given a nebulizer treatment with some improvement. At this time no other sxs noted. Given persistent cough 0.5 mg of epinephrine was injected IM to her Right later thigh. Improvement noted within minutes. Her cough continued but was less prominent and she was given an oral antihistamine. At this point she felt her cough was from post nasal drip and not her asthma. She was monitored for 1.5 hours after epinephrine and  2 hours since challenge was started. She was back to baseline " with exception of feeling mildly tired. Patient was offered to have family member call for transportation or a cab to called but she insisted she felt back to normal. Upon discharge her exam was normal.      ASSESSMENT & PLAN       Penicillin allergy: hives for a few days with intermittent wheezing after completing a week of penicillin.This was over 30 years and she has avoided since then. After administration of 42 ml noted to have persistent cough with no wheezing but did not chest tightness. Procedure was cancelled and she was treated as above. This reaction is consistent with an allergy to penicillin and hence this should continue to be avoided.     -Continue to avoid penicillins   -If penicillins are needed for a treatment and there is no other alternative she will need to undergo desensitization.   -Patient provided with pager for on call fellow if any issues arise after she has been discharged       Follow up: As needed        Russell Forde MD   [facility]